# Patient Record
Sex: FEMALE | Race: WHITE | Employment: PART TIME | URBAN - NONMETROPOLITAN AREA
[De-identification: names, ages, dates, MRNs, and addresses within clinical notes are randomized per-mention and may not be internally consistent; named-entity substitution may affect disease eponyms.]

---

## 2017-09-08 ENCOUNTER — APPOINTMENT (OUTPATIENT)
Dept: GENERAL RADIOLOGY | Age: 52
End: 2017-09-08
Payer: COMMERCIAL

## 2017-09-08 ENCOUNTER — HOSPITAL ENCOUNTER (EMERGENCY)
Age: 52
Discharge: HOME | End: 2017-09-08
Attending: EMERGENCY MEDICINE
Payer: COMMERCIAL

## 2017-09-08 VITALS
TEMPERATURE: 98.3 F | DIASTOLIC BLOOD PRESSURE: 72 MMHG | HEART RATE: 104 BPM | SYSTOLIC BLOOD PRESSURE: 139 MMHG | BODY MASS INDEX: 38.64 KG/M2 | HEIGHT: 62 IN | RESPIRATION RATE: 18 BRPM | WEIGHT: 210 LBS | OXYGEN SATURATION: 100 %

## 2017-09-08 DIAGNOSIS — S20.219A CONTUSION OF CHEST WALL, UNSPECIFIED LATERALITY, INITIAL ENCOUNTER: ICD-10-CM
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
## 2017-09-08 DIAGNOSIS — M23.92 KNEE INTERNAL DERANGEMENT, LEFT: ICD-10-CM

## 2017-09-08 DIAGNOSIS — S80.02XA CONTUSION OF LEFT KNEE, INITIAL ENCOUNTER: ICD-10-CM

## 2017-09-08 LAB
ALBUMIN SERPL-MCNC: 4.2 G/DL (ref 3.5–5.1)
ALP BLD-CCNC: 44 U/L (ref 38–126)
ALT SERPL-CCNC: 12 U/L (ref 11–66)
AMPHETAMINE+METHAMPHETAMINE URINE SCREEN: NEGATIVE
ANION GAP SERPL CALCULATED.3IONS-SCNC: 6 MEQ/L (ref 8–16)
AST SERPL-CCNC: 16 U/L (ref 5–40)
BARBITURATE QUANTITATIVE URINE: NEGATIVE
BASOPHILS # BLD: 0.7 %
BASOPHILS ABSOLUTE: 0.1 THOU/MM3 (ref 0–0.1)
BENZODIAZEPINE QUANTITATIVE URINE: NEGATIVE
BILIRUB SERPL-MCNC: 0.4 MG/DL (ref 0.3–1.2)
BUN BLDV-MCNC: 20 MG/DL (ref 7–22)
CALCIUM SERPL-MCNC: 9.4 MG/DL (ref 8.5–10.5)
CANNABINOID QUANTITATIVE URINE: NEGATIVE
CHLORIDE BLD-SCNC: 100 MEQ/L (ref 98–111)
CO2: 27 MEQ/L (ref 23–33)
COCAINE METABOLITE QUANTITATIVE URINE: NEGATIVE
CREAT SERPL-MCNC: 0.7 MG/DL (ref 0.4–1.2)
EOSINOPHIL # BLD: 1.1 %
EOSINOPHILS ABSOLUTE: 0.1 THOU/MM3 (ref 0–0.4)
ETHYL ALCOHOL, SERUM: < 0.01 %
GFR SERPL CREATININE-BSD FRML MDRD: 87 ML/MIN/1.73M2
GLUCOSE BLD-MCNC: 103 MG/DL (ref 70–108)
HCT VFR BLD CALC: 40.2 % (ref 37–47)
HEMOGLOBIN: 13.7 GM/DL (ref 12–16)
LYMPHOCYTES # BLD: 10 %
LYMPHOCYTES ABSOLUTE: 1.2 THOU/MM3 (ref 1–4.8)
MCH RBC QN AUTO: 31.4 PG (ref 27–31)
MCHC RBC AUTO-ENTMCNC: 34 GM/DL (ref 33–37)
MCV RBC AUTO: 92.4 FL (ref 81–99)
MONOCYTES # BLD: 5.3 %
MONOCYTES ABSOLUTE: 0.7 THOU/MM3 (ref 0.4–1.3)
NUCLEATED RED BLOOD CELLS: 0 /100 WBC
OPIATES, URINE: NEGATIVE
OSMOLALITY CALCULATION: 269.2 MOSMOL/KG (ref 275–300)
OXYCODONE: NEGATIVE
PDW BLD-RTO: 13.7 % (ref 11.5–14.5)
PHENCYCLIDINE QUANTITATIVE URINE: NEGATIVE
PLATELET # BLD: 383 THOU/MM3 (ref 130–400)
PMV BLD AUTO: 8.6 MCM (ref 7.4–10.4)
POTASSIUM SERPL-SCNC: 4.5 MEQ/L (ref 3.5–5.2)
RBC # BLD: 4.35 MILL/MM3 (ref 4.2–5.4)
RBC # BLD: NORMAL 10*6/UL
SEG NEUTROPHILS: 82.9 %
SEGMENTED NEUTROPHILS ABSOLUTE COUNT: 10.2 THOU/MM3 (ref 1.8–7.7)
SODIUM BLD-SCNC: 133 MEQ/L (ref 135–145)
TOTAL PROTEIN: 6.9 G/DL (ref 6.1–8)
WBC # BLD: 12.3 THOU/MM3 (ref 4.8–10.8)

## 2017-09-08 PROCEDURE — 80307 DRUG TEST PRSMV CHEM ANLYZR: CPT

## 2017-09-08 PROCEDURE — 71020 XR CHEST STANDARD TWO VW: CPT

## 2017-09-08 PROCEDURE — G0480 DRUG TEST DEF 1-7 CLASSES: HCPCS

## 2017-09-08 PROCEDURE — 99999 PR OFFICE/OUTPT VISIT,PROCEDURE ONLY: CPT | Performed by: NURSE PRACTITIONER

## 2017-09-08 PROCEDURE — 36415 COLL VENOUS BLD VENIPUNCTURE: CPT

## 2017-09-08 PROCEDURE — 6820000002 HC L2 INJURY CALL ACTIVATION

## 2017-09-08 PROCEDURE — 80053 COMPREHEN METABOLIC PANEL: CPT

## 2017-09-08 PROCEDURE — 99284 EMERGENCY DEPT VISIT MOD MDM: CPT

## 2017-09-08 PROCEDURE — 85025 COMPLETE CBC W/AUTO DIFF WBC: CPT

## 2017-09-08 RX ORDER — CYCLOBENZAPRINE HCL 10 MG
10 TABLET ORAL 3 TIMES DAILY PRN
Qty: 15 TABLET | Refills: 0 | Status: ON HOLD | OUTPATIENT
Start: 2017-09-08 | End: 2022-06-06

## 2017-09-08 RX ORDER — NAPROXEN 500 MG/1
500 TABLET ORAL 2 TIMES DAILY
Qty: 60 TABLET | Refills: 0 | Status: ON HOLD | OUTPATIENT
Start: 2017-09-08 | End: 2022-06-06

## 2017-09-08 ASSESSMENT — ENCOUNTER SYMPTOMS
VOMITING: 0
WHEEZING: 0
EYE PAIN: 0
EYE DISCHARGE: 0
BLOOD IN STOOL: 0
ABDOMINAL DISTENTION: 0
SINUS PRESSURE: 0
RHINORRHEA: 0
CHOKING: 0
SHORTNESS OF BREATH: 0
COLOR CHANGE: 0
DIARRHEA: 0
PHOTOPHOBIA: 0
CHEST TIGHTNESS: 0
STRIDOR: 0
NAUSEA: 0
FACIAL SWELLING: 0
COUGH: 0
SORE THROAT: 0
BACK PAIN: 0
EYE ITCHING: 0
VOICE CHANGE: 0
CONSTIPATION: 0
EYE REDNESS: 0
ABDOMINAL PAIN: 0
APNEA: 0
TROUBLE SWALLOWING: 0

## 2017-09-08 ASSESSMENT — PAIN DESCRIPTION - FREQUENCY: FREQUENCY: CONTINUOUS

## 2017-09-08 ASSESSMENT — PAIN DESCRIPTION - PAIN TYPE: TYPE: ACUTE PAIN

## 2017-09-08 ASSESSMENT — PAIN SCALES - GENERAL: PAINLEVEL_OUTOF10: 3

## 2017-09-08 ASSESSMENT — PAIN DESCRIPTION - ORIENTATION: ORIENTATION: LEFT

## 2017-09-08 ASSESSMENT — PAIN DESCRIPTION - LOCATION: LOCATION: KNEE

## 2018-07-24 ENCOUNTER — HOSPITAL ENCOUNTER (EMERGENCY)
Dept: HOSPITAL 25 - ED | Age: 53
Discharge: HOME | End: 2018-07-24
Payer: SELF-PAY

## 2018-07-24 VITALS — DIASTOLIC BLOOD PRESSURE: 78 MMHG | SYSTOLIC BLOOD PRESSURE: 132 MMHG

## 2018-07-24 DIAGNOSIS — Z88.0: ICD-10-CM

## 2018-07-24 DIAGNOSIS — R11.2: ICD-10-CM

## 2018-07-24 DIAGNOSIS — R07.9: Primary | ICD-10-CM

## 2018-07-24 LAB
BASOPHILS # BLD AUTO: 0 10^3/UL (ref 0–0.2)
EOSINOPHIL # BLD AUTO: 0.1 10^3/UL (ref 0–0.6)
HCT VFR BLD AUTO: 39 % (ref 35–47)
HGB BLD-MCNC: 12.7 G/DL (ref 12–16)
LYMPHOCYTES # BLD AUTO: 1.2 10^3/UL (ref 1–4.8)
MCH RBC QN AUTO: 30 PG (ref 27–31)
MCHC RBC AUTO-ENTMCNC: 33 G/DL (ref 31–36)
MCV RBC AUTO: 92 FL (ref 80–97)
MONOCYTES # BLD AUTO: 0.7 10^3/UL (ref 0–0.8)
NEUTROPHILS # BLD AUTO: 11.7 10^3/UL (ref 1.5–7.7)
NRBC # BLD AUTO: 0 10^3/UL
NRBC BLD QL AUTO: 0
PLATELET # BLD AUTO: 320 10^3/UL (ref 150–450)
RBC # BLD AUTO: 4.24 10^6/UL (ref 4–5.4)
WBC # BLD AUTO: 13.7 10^3/UL (ref 3.5–10.8)

## 2018-07-24 PROCEDURE — 96374 THER/PROPH/DIAG INJ IV PUSH: CPT

## 2018-07-24 PROCEDURE — 83735 ASSAY OF MAGNESIUM: CPT

## 2018-07-24 PROCEDURE — 36415 COLL VENOUS BLD VENIPUNCTURE: CPT

## 2018-07-24 PROCEDURE — 80053 COMPREHEN METABOLIC PANEL: CPT

## 2018-07-24 PROCEDURE — 99283 EMERGENCY DEPT VISIT LOW MDM: CPT

## 2018-07-24 PROCEDURE — 83605 ASSAY OF LACTIC ACID: CPT

## 2018-07-24 PROCEDURE — 85025 COMPLETE CBC W/AUTO DIFF WBC: CPT

## 2018-07-24 PROCEDURE — 84443 ASSAY THYROID STIM HORMONE: CPT

## 2018-07-24 PROCEDURE — 82553 CREATINE MB FRACTION: CPT

## 2018-07-24 PROCEDURE — 84484 ASSAY OF TROPONIN QUANT: CPT

## 2018-07-24 PROCEDURE — 71045 X-RAY EXAM CHEST 1 VIEW: CPT

## 2018-07-24 PROCEDURE — 93005 ELECTROCARDIOGRAM TRACING: CPT

## 2018-07-24 PROCEDURE — 83880 ASSAY OF NATRIURETIC PEPTIDE: CPT

## 2018-07-24 NOTE — ED
HPI Chest Pain





- HPI Summary


HPI Summary: 


51 y/o male presents to the ED c/o sudden onset CP starting 2 hours PTA. Pt was 

sitting in the car when it started. CP feeling better now. Pain described as a 

pressure, located in the mid-sternal region. Pt never had these symptoms 

before. Pain rated 5/10. Denies PMHx DM, HTN. Pt vomited in the ED, and states 

she feels better after it. Associated sx: nausea.








This is scribe Ed Zoey documenting for attending Ramiro Vargas MD





- History of Current Complaint


Chief Complaint: EDChestPainROMI


Time Seen by Provider: 07/24/18 14:38


Hx Obtained From: Patient


Onset/Duration: Started Hours Ago


Initial Severity: Moderate


Current Severity: Mild


Pain Intensity: 5


Chest Pain Location: Mid Sternal


Chest Pain Radiates To:: Epigastric


Character: Pressure/Squeezing


Aggravating Factor(s): Nothing


Alleviating Factor(s): Other: - vomiting


Associated Signs and Symptoms: Positive: Chest Pain, Nausea, Vomiting





- Allergy/Home Medications


Allergies/Adverse Reactions: 


 Allergies











Allergy/AdvReac Type Severity Reaction Status Date / Time


 


Sulfa (Sulfonamide Allergy  Hives Verified 07/24/18 14:43





Antibiotics)     











Home Medications: 


 Home Medications





Magnesium Oxide [Magnesium] 400 mg PO DAILY 07/24/18 [History Confirmed 07/24/18

]


Multivitamins/Minerals TAB* [Theragran/minerals TAB*] 1 tab PO DAILY 07/24/18 [

History Confirmed 07/24/18]


Vitamin B Complex CAP* [B Complex CAP*] 1 cap PO DAILY 07/24/18 [History 

Confirmed 07/24/18]











PMH/Surg Hx/FS Hx/Imm Hx


Previously Healthy: No


Endocrine/Hematology History: 


   Denies: Hx Diabetes


Cardiovascular History: 


   Denies: Hx Hypertension





- Immunization History


Immunizations Up to Date: Yes


Infectious Disease History: No


Infectious Disease History: 


   Denies: Traveled Outside the [place] in Last 30 Days





- Family History


Known Family History: Positive: Unknown





- Social History


Alcohol Use: None


Hx Substance Use: No


Substance Use Type: Reports: None


Hx Tobacco Use: No


Smoking Status (MU): Never Smoked Tobacco





Review of Systems


Constitutional: Negative


Eyes: Negative


ENT: Negative


Positive: Chest Pain


Respiratory: Negative


Positive: Vomiting, Nausea


Genitourinary: Negative


Musculoskeletal: Negative


Skin: Negative


Neurological: Negative


Psychological: Normal


All Other Systems Reviewed And Are Negative: Yes





Physical Exam





- Summary


Physical Exam Summary: 





VITAL SIGNS: Reviewed.


GENERAL: Patient is a well-developed and nourished female who is lying 

comfortable in the stretcher. Patient is not in any acute respiratory distress.


HEAD AND FACE: No signs of trauma. No ecchymosis, hematomas or skull 

depressions. No sinus tenderness.


EYES: PERRLA, EOMI x 2, No injected conjunctiva, no nystagmus.


EARS: Hearing grossly intact. Ear canals and tympanic membranes are within 

normal limits.


MOUTH: Oropharynx within normal limits.


NECK: Supple, trachea is midline, no adenopathy, no JVD, no carotid bruit, no c-

spine tenderness, neck with full ROM.


CHEST: Symmetric, no tenderness at palpation


LUNGS: Clear to auscultation bilaterally. No wheezing or crackles.


CVS: Regular rate and rhythm, S1 and S2 present, no murmurs or gallops 

appreciated.


ABDOMEN: Soft, non-tender. No signs of distention. No rebound no guarding, and 

no masses palpated. Bowel sounds are normal.


EXTREMITIES: FROM in all major joints, no edema, no cyanosis or clubbing.


NEURO: Alert and oriented x 3. No acute neurological deficits. Speech is normal 

and follows commands.


SKIN: Dry and warm


Triage Information Reviewed: Yes


Vital Signs On Initial Exam: 


 Initial Vitals











Temp Pulse Resp BP Pulse Ox


 


 98.2 F   67   16   126/67   99 


 


 07/24/18 14:25  07/24/18 14:25  07/24/18 14:25  07/24/18 14:25  07/24/18 14:25











Vital Signs Reviewed: Yes





Diagnostics





- Vital Signs


 Vital Signs











  Temp Pulse Resp BP Pulse Ox


 


 07/24/18 14:25  98.2 F  67  16  126/67  99














- Laboratory


Result Diagrams: 


 07/24/18 15:56





 07/24/18 15:56


Lab Statement: Any lab studies that have been ordered have been reviewed, and 

results considered in the medical decision making process.





- Radiology


  ** CXR


Xray Interpretation: No Acute Changes - NO ACTIVE CARDIOPULMONARY DISEASE


Radiology Interpretation Completed By: Radiologist





- Additional Comments


Diagnostic Additional Comments: 





15:09 - SR @ 73 BPM. No ST elevations. Q wave in III.





Re-Evaluation





- Re-Evaluation


  ** 1


Re-Evaluation Time: 18:13


Change: Improved


Comment: Discussed test results and findings, plan of care





Chest Pain Course/Dx





- Course


Assessment/Plan: Patient is a 52-year-old female who presents to the emergency 

department with a chief complaint of having chest pain.  Patient reports that  

she developed this pain after she ate a hamburger.  During the physical exam 

actually the  pain is in the epigastric area and the distal the chest.  While 

she was waiting in the emergency department she developed nausea and vomiting 

and after she vomited SYMPTOMS resolved.  At this time the patient is 

asymptomatic.  Blood test results without any significant abnormality.  2 

troponins 4 hours apart at 0.00.  EKG is a normal sinus rhythm without any ST 

elevation.  Chest x-ray impression: No active cardiopulmonary disease.  In the 

emergency room course the patient has remained asymptomatic, all her symptoms 

have resolved and not returned.  I discussed all the findings and test results 

with the patient and the patients  and the need to follow up with the 

primary care physician.  She was instructed to return to the emergency 

department if she develops any other symptoms.  The patient and the patients 

 understand and agree.  The patient is hemodynamically stable alert and 

oriented 3.





- Diagnoses


Provider Diagnoses: 


 Chest pain








Discharge





- Sign-Out/Discharge


Documenting (check all that apply): Patient Departure





- Discharge Plan


Condition: Stable


Disposition: HOME


Patient Education Materials:  Chest Pain (ED)


Referrals: 


[facility] [place] [facility] [Outside] - 4 Days (PLEASE F/U IN 3-5 DAYS)


Additional Instructions: 


RETURN TO THE ED FOR CHANGING/WORSENING SYMPTOMS





- Billing Disposition and Condition


Condition: STABLE


Disposition: Home

## 2018-07-24 NOTE — RAD
HISTORY: CP, chest pain



COMPARISONS: None



VIEWS: 1: frontal portable view of the chest at 3:05 PM



FINDINGS:

LINES AND TUBES: None.

CARDIOMEDIASTINAL SILHOUETTE: The cardiomediastinal silhouette is normal for portable

technique.

PLEURA: The costophrenic angles are sharp. No pleural abnormalities are noted.

LUNG PARENCHYMA: The lungs are clear.

ABDOMEN: The upper abdomen is clear. There is no subphrenic gas.

BONES AND SOFT TISSUES: Degenerative changes are noted along the spine.



IMPRESSION: NO ACTIVE CARDIOPULMONARY DISEASE.

## 2022-06-04 ENCOUNTER — APPOINTMENT (OUTPATIENT)
Dept: ULTRASOUND IMAGING | Age: 57
DRG: 415 | End: 2022-06-04

## 2022-06-04 ENCOUNTER — APPOINTMENT (OUTPATIENT)
Dept: CT IMAGING | Age: 57
DRG: 415 | End: 2022-06-04

## 2022-06-04 ENCOUNTER — HOSPITAL ENCOUNTER (INPATIENT)
Age: 57
LOS: 4 days | Discharge: HOME | DRG: 415 | End: 2022-06-08
Attending: EMERGENCY MEDICINE | Admitting: INTERNAL MEDICINE
Payer: COMMERCIAL

## 2022-06-04 DIAGNOSIS — R10.84 GENERALIZED ABDOMINAL PAIN: ICD-10-CM

## 2022-06-04 DIAGNOSIS — R10.11 RIGHT UPPER QUADRANT ABDOMINAL PAIN: ICD-10-CM

## 2022-06-04 DIAGNOSIS — N30.01 ACUTE CYSTITIS WITH HEMATURIA: ICD-10-CM

## 2022-06-04 DIAGNOSIS — K80.00 ACUTE CHOLECYSTITIS DUE TO BILIARY CALCULUS: Primary | ICD-10-CM

## 2022-06-04 DIAGNOSIS — D72.829 LEUKOCYTOSIS, UNSPECIFIED TYPE: ICD-10-CM

## 2022-06-04 DIAGNOSIS — K80.00 CALCULUS OF GALLBLADDER WITH ACUTE CHOLECYSTITIS WITHOUT OBSTRUCTION: ICD-10-CM

## 2022-06-04 LAB
ALBUMIN SERPL-MCNC: 4.3 G/DL (ref 3.5–5.1)
ALP BLD-CCNC: 95 U/L (ref 38–126)
ALT SERPL-CCNC: 44 U/L (ref 11–66)
AMORPHOUS: ABNORMAL
AST SERPL-CCNC: 49 U/L (ref 5–40)
BACTERIA: ABNORMAL /HPF
BASOPHILS # BLD: 0.1 %
BASOPHILS ABSOLUTE: 0 THOU/MM3 (ref 0–0.1)
BILIRUB SERPL-MCNC: 1.5 MG/DL (ref 0.3–1.2)
BILIRUBIN DIRECT: 0.6 MG/DL (ref 0–0.3)
BILIRUBIN URINE: ABNORMAL
BILIRUBIN URINE: ABNORMAL
BLOOD, URINE: ABNORMAL
BLOOD, URINE: ABNORMAL
BUN, WHOLE BLOOD: 20 MG/DL (ref 8–26)
CASTS UA: ABNORMAL /LPF
CHARACTER, URINE: ABNORMAL
CHARACTER, URINE: ABNORMAL
CHLORIDE, WHOLE BLOOD: 103 MEQ/L (ref 98–109)
COLOR: ABNORMAL
COLOR: ABNORMAL
CREAT SERPL-MCNC: 0.5 MG/DL (ref 0.5–1.2)
CRYSTALS, UA: ABNORMAL
EKG ATRIAL RATE: 119 BPM
EKG P AXIS: 65 DEGREES
EKG P-R INTERVAL: 156 MS
EKG Q-T INTERVAL: 316 MS
EKG QRS DURATION: 78 MS
EKG QTC CALCULATION (BAZETT): 444 MS
EKG R AXIS: 4 DEGREES
EKG T AXIS: 68 DEGREES
EKG VENTRICULAR RATE: 119 BPM
EOSINOPHIL # BLD: 0 %
EOSINOPHILS ABSOLUTE: 0 THOU/MM3 (ref 0–0.4)
EPITHELIAL CELLS, UA: ABNORMAL /HPF
GFR, ESTIMATED: > 90 ML/MIN/1.73M2
GLUCOSE BLD-MCNC: 149 MG/DL (ref 70–108)
GLUCOSE URINE: NEGATIVE MG/DL
GLUCOSE URINE: NEGATIVE MG/DL
GLUCOSE, WHOLE BLOOD: 183 MG/DL (ref 70–108)
HCT VFR BLD CALC: 41.9 % (ref 37–47)
HEMOGLOBIN: 14.3 GM/DL (ref 12–16)
ICTOTEST: NEGATIVE
IMMATURE GRANS (ABS): 0.17 THOU/MM3 (ref 0–0.07)
IMMATURE GRANULOCYTES: 0.7 %
KETONES, URINE: 15
KETONES, URINE: ABNORMAL
LACTIC ACID, SEPSIS: 1.4 MMOL/L (ref 0.5–1.9)
LACTIC ACID, SEPSIS: 2.4 MMOL/L (ref 0.5–1.9)
LEUKOCYTE ESTERASE, URINE: NEGATIVE
LEUKOCYTE ESTERASE, URINE: NEGATIVE
LIPASE: 9 U/L (ref 5.6–51.3)
LYMPHOCYTES # BLD: 3.4 %
LYMPHOCYTES ABSOLUTE: 0.9 THOU/MM3 (ref 1–4.8)
MCH RBC QN AUTO: 31.1 PG (ref 27–31)
MCHC RBC AUTO-ENTMCNC: 34.1 GM/DL (ref 33–37)
MCV RBC AUTO: 91 FL (ref 81–99)
MONOCYTES # BLD: 7.8 %
MONOCYTES ABSOLUTE: 2 THOU/MM3 (ref 0.4–1.3)
NITRITE, URINE: POSITIVE
NITRITE, URINE: POSITIVE
NUCLEATED RED BLOOD CELLS: 0 /100 WBC
PDW BLD-RTO: 13.7 % (ref 11.5–14.5)
PH UA: 5 (ref 5–9)
PH UA: 5.5 (ref 5–9)
PLATELET # BLD: 412 THOU/MM3 (ref 130–400)
PMV BLD AUTO: 10.6 FL (ref 7.4–10.4)
POTASSIUM, WHOLE BLOOD: 3.8 MEQ/L (ref 3.5–4.9)
PREGNANCY, SERUM: NEGATIVE
PROCALCITONIN: 0.83 NG/ML (ref 0.01–0.09)
PROTEIN UA: 100
PROTEIN UA: 100 MG/DL
RBC # BLD: 4.6 MILL/MM3 (ref 4.2–5.4)
RBC URINE: ABNORMAL /HPF
RENAL EPITHELIAL, UA: ABNORMAL
SEG NEUTROPHILS: 88 %
SEGMENTED NEUTROPHILS ABSOLUTE COUNT: 22.4 THOU/MM3 (ref 1.8–7.7)
SODIUM BLD-SCNC: 133 MEQ/L (ref 138–146)
SPECIFIC GRAVITY, URINE: >= 1.03 (ref 1–1.03)
SPECIFIC GRAVITY, URINE: >= 1.03 (ref 1–1.03)
TOTAL CO2, WHOLE BLOOD: 23 MEQ/L (ref 23–33)
TOTAL PROTEIN: 7.5 G/DL (ref 6.1–8)
UROBILINOGEN, URINE: 1 EU/DL (ref 0.2–1)
UROBILINOGEN, URINE: 2 EU/DL (ref 0–1)
WBC # BLD: 25.4 THOU/MM3 (ref 4.8–10.8)
WBC UA: ABNORMAL /HPF
YEAST: ABNORMAL

## 2022-06-04 PROCEDURE — 87040 BLOOD CULTURE FOR BACTERIA: CPT

## 2022-06-04 PROCEDURE — 99285 EMERGENCY DEPT VISIT HI MDM: CPT

## 2022-06-04 PROCEDURE — 83605 ASSAY OF LACTIC ACID: CPT

## 2022-06-04 PROCEDURE — 76705 ECHO EXAM OF ABDOMEN: CPT

## 2022-06-04 PROCEDURE — C9113 INJ PANTOPRAZOLE SODIUM, VIA: HCPCS | Performed by: EMERGENCY MEDICINE

## 2022-06-04 PROCEDURE — 83690 ASSAY OF LIPASE: CPT

## 2022-06-04 PROCEDURE — 99215 OFFICE O/P EST HI 40 MIN: CPT

## 2022-06-04 PROCEDURE — 81001 URINALYSIS AUTO W/SCOPE: CPT

## 2022-06-04 PROCEDURE — 84145 PROCALCITONIN (PCT): CPT

## 2022-06-04 PROCEDURE — 2060000000 HC ICU INTERMEDIATE R&B

## 2022-06-04 PROCEDURE — 82947 ASSAY GLUCOSE BLOOD QUANT: CPT

## 2022-06-04 PROCEDURE — 6360000002 HC RX W HCPCS: Performed by: EMERGENCY MEDICINE

## 2022-06-04 PROCEDURE — 80051 ELECTROLYTE PANEL: CPT

## 2022-06-04 PROCEDURE — 6370000000 HC RX 637 (ALT 250 FOR IP): Performed by: INTERNAL MEDICINE

## 2022-06-04 PROCEDURE — 85025 COMPLETE CBC W/AUTO DIFF WBC: CPT

## 2022-06-04 PROCEDURE — 96361 HYDRATE IV INFUSION ADD-ON: CPT

## 2022-06-04 PROCEDURE — 80076 HEPATIC FUNCTION PANEL: CPT

## 2022-06-04 PROCEDURE — 81003 URINALYSIS AUTO W/O SCOPE: CPT

## 2022-06-04 PROCEDURE — 36415 COLL VENOUS BLD VENIPUNCTURE: CPT

## 2022-06-04 PROCEDURE — 2580000003 HC RX 258: Performed by: INTERNAL MEDICINE

## 2022-06-04 PROCEDURE — 74176 CT ABD & PELVIS W/O CONTRAST: CPT

## 2022-06-04 PROCEDURE — 96365 THER/PROPH/DIAG IV INF INIT: CPT

## 2022-06-04 PROCEDURE — 2500000003 HC RX 250 WO HCPCS: Performed by: INTERNAL MEDICINE

## 2022-06-04 PROCEDURE — 82565 ASSAY OF CREATININE: CPT

## 2022-06-04 PROCEDURE — 93005 ELECTROCARDIOGRAM TRACING: CPT | Performed by: INTERNAL MEDICINE

## 2022-06-04 PROCEDURE — 87086 URINE CULTURE/COLONY COUNT: CPT

## 2022-06-04 PROCEDURE — 84520 ASSAY OF UREA NITROGEN: CPT

## 2022-06-04 PROCEDURE — 82948 REAGENT STRIP/BLOOD GLUCOSE: CPT

## 2022-06-04 PROCEDURE — 87801 DETECT AGNT MULT DNA AMPLI: CPT

## 2022-06-04 PROCEDURE — 83036 HEMOGLOBIN GLYCOSYLATED A1C: CPT

## 2022-06-04 PROCEDURE — 84703 CHORIONIC GONADOTROPIN ASSAY: CPT

## 2022-06-04 PROCEDURE — 6360000002 HC RX W HCPCS: Performed by: INTERNAL MEDICINE

## 2022-06-04 PROCEDURE — 2580000003 HC RX 258: Performed by: EMERGENCY MEDICINE

## 2022-06-04 PROCEDURE — 96375 TX/PRO/DX INJ NEW DRUG ADDON: CPT

## 2022-06-04 PROCEDURE — A4216 STERILE WATER/SALINE, 10 ML: HCPCS | Performed by: EMERGENCY MEDICINE

## 2022-06-04 PROCEDURE — 99223 1ST HOSP IP/OBS HIGH 75: CPT | Performed by: SURGERY

## 2022-06-04 RX ORDER — SODIUM CHLORIDE 0.9 % (FLUSH) 0.9 %
5-40 SYRINGE (ML) INJECTION EVERY 12 HOURS SCHEDULED
Status: DISCONTINUED | OUTPATIENT
Start: 2022-06-04 | End: 2022-06-08 | Stop reason: HOSPADM

## 2022-06-04 RX ORDER — ONDANSETRON 4 MG/1
4 TABLET, ORALLY DISINTEGRATING ORAL EVERY 8 HOURS PRN
Status: DISCONTINUED | OUTPATIENT
Start: 2022-06-04 | End: 2022-06-08 | Stop reason: HOSPADM

## 2022-06-04 RX ORDER — POTASSIUM CHLORIDE 20 MEQ/1
40 TABLET, EXTENDED RELEASE ORAL PRN
Status: DISCONTINUED | OUTPATIENT
Start: 2022-06-04 | End: 2022-06-08 | Stop reason: HOSPADM

## 2022-06-04 RX ORDER — FAMOTIDINE 10 MG/ML
20 INJECTION, SOLUTION INTRAVENOUS 2 TIMES DAILY
Status: DISCONTINUED | OUTPATIENT
Start: 2022-06-04 | End: 2022-06-08 | Stop reason: HOSPADM

## 2022-06-04 RX ORDER — ACETAMINOPHEN 650 MG/1
650 SUPPOSITORY RECTAL EVERY 6 HOURS PRN
Status: DISCONTINUED | OUTPATIENT
Start: 2022-06-04 | End: 2022-06-08 | Stop reason: HOSPADM

## 2022-06-04 RX ORDER — SODIUM CHLORIDE 9 MG/ML
INJECTION, SOLUTION INTRAVENOUS CONTINUOUS
Status: DISCONTINUED | OUTPATIENT
Start: 2022-06-04 | End: 2022-06-05

## 2022-06-04 RX ORDER — SODIUM CHLORIDE 9 MG/ML
INJECTION, SOLUTION INTRAVENOUS CONTINUOUS
Status: DISCONTINUED | OUTPATIENT
Start: 2022-06-04 | End: 2022-06-08

## 2022-06-04 RX ORDER — ONDANSETRON 2 MG/ML
4 INJECTION INTRAMUSCULAR; INTRAVENOUS ONCE
Status: COMPLETED | OUTPATIENT
Start: 2022-06-04 | End: 2022-06-04

## 2022-06-04 RX ORDER — ACETAMINOPHEN 325 MG/1
650 TABLET ORAL EVERY 6 HOURS PRN
Status: DISCONTINUED | OUTPATIENT
Start: 2022-06-04 | End: 2022-06-08 | Stop reason: HOSPADM

## 2022-06-04 RX ORDER — SODIUM CHLORIDE 9 MG/ML
INJECTION, SOLUTION INTRAVENOUS PRN
Status: DISCONTINUED | OUTPATIENT
Start: 2022-06-04 | End: 2022-06-08 | Stop reason: HOSPADM

## 2022-06-04 RX ORDER — INDOCYANINE GREEN AND WATER 25 MG
1.25 KIT INJECTION ONCE
Status: DISCONTINUED | OUTPATIENT
Start: 2022-06-05 | End: 2022-06-05 | Stop reason: HOSPADM

## 2022-06-04 RX ORDER — POLYETHYLENE GLYCOL 3350 17 G/17G
17 POWDER, FOR SOLUTION ORAL DAILY PRN
Status: DISCONTINUED | OUTPATIENT
Start: 2022-06-04 | End: 2022-06-08 | Stop reason: HOSPADM

## 2022-06-04 RX ORDER — ENOXAPARIN SODIUM 100 MG/ML
30 INJECTION SUBCUTANEOUS 2 TIMES DAILY
Status: DISCONTINUED | OUTPATIENT
Start: 2022-06-04 | End: 2022-06-05

## 2022-06-04 RX ORDER — INSULIN LISPRO 100 [IU]/ML
0-6 INJECTION, SOLUTION INTRAVENOUS; SUBCUTANEOUS EVERY 4 HOURS
Status: DISCONTINUED | OUTPATIENT
Start: 2022-06-04 | End: 2022-06-08 | Stop reason: HOSPADM

## 2022-06-04 RX ORDER — ONDANSETRON 2 MG/ML
4 INJECTION INTRAMUSCULAR; INTRAVENOUS EVERY 6 HOURS PRN
Status: DISCONTINUED | OUTPATIENT
Start: 2022-06-04 | End: 2022-06-08 | Stop reason: HOSPADM

## 2022-06-04 RX ORDER — DEXTROSE MONOHYDRATE 50 MG/ML
100 INJECTION, SOLUTION INTRAVENOUS PRN
Status: DISCONTINUED | OUTPATIENT
Start: 2022-06-04 | End: 2022-06-08 | Stop reason: HOSPADM

## 2022-06-04 RX ORDER — POTASSIUM CHLORIDE 7.45 MG/ML
10 INJECTION INTRAVENOUS PRN
Status: DISCONTINUED | OUTPATIENT
Start: 2022-06-04 | End: 2022-06-08 | Stop reason: HOSPADM

## 2022-06-04 RX ORDER — SODIUM CHLORIDE 0.9 % (FLUSH) 0.9 %
5-40 SYRINGE (ML) INJECTION PRN
Status: DISCONTINUED | OUTPATIENT
Start: 2022-06-04 | End: 2022-06-08 | Stop reason: HOSPADM

## 2022-06-04 RX ADMIN — SODIUM CHLORIDE, PRESERVATIVE FREE 10 ML: 5 INJECTION INTRAVENOUS at 21:30

## 2022-06-04 RX ADMIN — HYDROMORPHONE HYDROCHLORIDE 0.5 MG: 1 INJECTION, SOLUTION INTRAMUSCULAR; INTRAVENOUS; SUBCUTANEOUS at 15:01

## 2022-06-04 RX ADMIN — SODIUM CHLORIDE 40 MG: 9 INJECTION, SOLUTION INTRAMUSCULAR; INTRAVENOUS; SUBCUTANEOUS at 15:16

## 2022-06-04 RX ADMIN — ONDANSETRON 4 MG: 2 INJECTION INTRAMUSCULAR; INTRAVENOUS at 15:01

## 2022-06-04 RX ADMIN — SODIUM CHLORIDE: 9 INJECTION, SOLUTION INTRAVENOUS at 18:34

## 2022-06-04 RX ADMIN — PIPERACILLIN AND TAZOBACTAM 3375 MG: 3; .375 INJECTION, POWDER, LYOPHILIZED, FOR SOLUTION INTRAVENOUS at 23:34

## 2022-06-04 RX ADMIN — PIPERACILLIN AND TAZOBACTAM 3375 MG: 3; .375 INJECTION, POWDER, LYOPHILIZED, FOR SOLUTION INTRAVENOUS at 15:39

## 2022-06-04 RX ADMIN — ONDANSETRON 4 MG: 2 INJECTION INTRAMUSCULAR; INTRAVENOUS at 18:48

## 2022-06-04 RX ADMIN — SODIUM CHLORIDE: 9 INJECTION, SOLUTION INTRAVENOUS at 15:20

## 2022-06-04 RX ADMIN — FAMOTIDINE 20 MG: 10 INJECTION, SOLUTION INTRAVENOUS at 21:30

## 2022-06-04 RX ADMIN — INSULIN LISPRO 1 UNITS: 100 INJECTION, SOLUTION INTRAVENOUS; SUBCUTANEOUS at 21:16

## 2022-06-04 RX ADMIN — HYDROMORPHONE HYDROCHLORIDE 0.5 MG: 1 INJECTION, SOLUTION INTRAMUSCULAR; INTRAVENOUS; SUBCUTANEOUS at 18:48

## 2022-06-04 RX ADMIN — ENOXAPARIN SODIUM 30 MG: 100 INJECTION SUBCUTANEOUS at 21:31

## 2022-06-04 ASSESSMENT — PAIN DESCRIPTION - DESCRIPTORS
DESCRIPTORS: ACHING
DESCRIPTORS: SHOOTING
DESCRIPTORS: SHOOTING;SHARP
DESCRIPTORS: SHARP

## 2022-06-04 ASSESSMENT — ENCOUNTER SYMPTOMS
SHORTNESS OF BREATH: 0
VOICE CHANGE: 0
SORE THROAT: 0
NAUSEA: 1
RHINORRHEA: 0
CHEST TIGHTNESS: 0
BLOOD IN STOOL: 0
VOMITING: 1
SINUS PRESSURE: 0
CONSTIPATION: 0
WHEEZING: 0
DIARRHEA: 0
TROUBLE SWALLOWING: 0
ABDOMINAL DISTENTION: 0
ABDOMINAL PAIN: 1
DIARRHEA: 1
COUGH: 0
BACK PAIN: 0

## 2022-06-04 ASSESSMENT — PAIN SCALES - GENERAL
PAINLEVEL_OUTOF10: 4
PAINLEVEL_OUTOF10: 5
PAINLEVEL_OUTOF10: 2
PAINLEVEL_OUTOF10: 3
PAINLEVEL_OUTOF10: 5
PAINLEVEL_OUTOF10: 6
PAINLEVEL_OUTOF10: 8
PAINLEVEL_OUTOF10: 3

## 2022-06-04 ASSESSMENT — PAIN DESCRIPTION - PAIN TYPE: TYPE: ACUTE PAIN

## 2022-06-04 ASSESSMENT — PAIN DESCRIPTION - ONSET: ONSET: ON-GOING

## 2022-06-04 ASSESSMENT — PAIN DESCRIPTION - ORIENTATION
ORIENTATION: RIGHT

## 2022-06-04 ASSESSMENT — PAIN DESCRIPTION - LOCATION
LOCATION: ABDOMEN

## 2022-06-04 ASSESSMENT — PAIN DESCRIPTION - FREQUENCY: FREQUENCY: CONTINUOUS

## 2022-06-04 ASSESSMENT — PAIN - FUNCTIONAL ASSESSMENT
PAIN_FUNCTIONAL_ASSESSMENT: 0-10
PAIN_FUNCTIONAL_ASSESSMENT: PREVENTS OR INTERFERES SOME ACTIVE ACTIVITIES AND ADLS

## 2022-06-04 NOTE — ED NOTES
Patient feeling much more comfortable after medications given. IV antibiotics started. No distress noted. Patient denies needs.      Jessica Hong RN  06/04/22 [id number]

## 2022-06-04 NOTE — ED NOTES
Report to [facility] ED given to STEPHON Harris per Modesta Sands Case, CNP Pt has been transferred by private car from [facility] and with go to [facility] ED for further evaluation in stable condition.      Oscar Senior RN  06/04/22 1779

## 2022-06-04 NOTE — ED TRIAGE NOTES
Pt states that 2 days ago she had lower abdominal pain and attributed to possible food poisoning although pt denied diarrhea or vomiting at that time. Pt states she felt better in am but later yesterday began to have nausea and right lower abdominal pain with vomiting x 3 episodes in 24 hours. Today she continues to have RLQ abdominal pain and hematuria. Denies dysuria. Denies fever.

## 2022-06-04 NOTE — H&P
Heart murmur      SHX:        Procedure Laterality Date    KNEE SURGERY      MANDIBLE SURGERY       FHX: History reviewed. No pertinent family history. SOCHX:   Social History     Socioeconomic History    Marital status:      Spouse name: None    Number of children: None    Years of education: None    Highest education level: None   Occupational History    None   Tobacco Use    Smoking status: Never Smoker    Smokeless tobacco: Never Used   Vaping Use    Vaping Use: Never used   Substance and Sexual Activity    Alcohol use: No    Drug use: No    Sexual activity: None   Other Topics Concern    None   Social History Narrative    None     Social Determinants of Health     Financial Resource Strain:     Difficulty of Paying Living Expenses: Not on file   Food Insecurity:     Worried About Running Out of Food in the Last Year: Not on file    Ti of Food in the Last Year: Not on file   Transportation Needs:     Lack of Transportation (Medical): Not on file    Lack of Transportation (Non-Medical):  Not on file   Physical Activity:     Days of Exercise per Week: Not on file    Minutes of Exercise per Session: Not on file   Stress:     Feeling of Stress : Not on file   Social Connections:     Frequency of Communication with Friends and Family: Not on file    Frequency of Social Gatherings with Friends and Family: Not on file    Attends Jain Services: Not on file    Active Member of [address] [place] [address] or Organizations: Not on file    Attends Club or Organization Meetings: Not on file    Marital Status: Not on file   Intimate Partner Violence:     Fear of Current or Ex-Partner: Not on file    Emotionally Abused: Not on file    Physically Abused: Not on file    Sexually Abused: Not on file   Housing Stability:     Unable to Pay for Housing in the Last Year: Not on file    Number of [place] in the Last Year: Not on file    Unstable Housing in the Last Year: Not on file      Allergies: Sulfa antibiotics  Medications:     sodium chloride 100 mL/hr at 06/04/22 1520      piperacillin-tazobactam  3,375 mg IntraVENous Q8H     Prior to Admission medications    Medication Sig Start Date End Date Taking? Authorizing Provider   naproxen (NAPROSYN) 500 MG tablet Take 1 tablet by mouth 2 times daily  Patient not taking: Reported on 6/4/2022 9/8/17   Mitul Fuentes MD   cyclobenzaprine (FLEXERIL) 10 MG tablet Take 1 tablet by mouth 3 times daily as needed for Muscle spasms  Patient not taking: Reported on 6/4/2022 9/8/17   Mitul Fuentes MD      PHYSICAL EXAM:    BP (!) 155/83   Pulse (!) 113   Temp 98.6 °F (37 °C) (Oral)   Resp 16   Ht 5' 2\" (1.575 m)   Wt 231 lb (104.8 kg)   SpO2 94%   BMI 42.25 kg/m²     General appearance:  No apparent distress, appears stated age and cooperative. HEENT:  Normal cephalic, atraumatic without obvious deformity. Pupils equal, round, and reactive to light. Extra ocular muscles intact. Conjunctivae/corneas clear. Neck: Supple, with full range of motion. no jugular venous distention. Trachea midline. no carotid bruits  Respiratory:  Normal respiratory effort. Clear to auscultation, bilaterally without Rales/Wheezes/Rhonchi. Breath sounds equal bilaterally  Cardiovascular:  Regular rate and rhythm with normal S1/S2 without murmurs, rubs or gallops. PMI non displaced  Abdomen: Soft, RUQ tender, non-distended with normal bowel sounds. No guarding, rebound. Musculoskeletal:  No clubbing, cyanosis or edema bilaterally. Full range of motion without deformity. Skin: Skin color, texture, turgor normal.  No rashes or lesions, or suspicious lesions. Neurologic:  Neurovascularly intact without any focal sensory/motor deficits.  Cranial nerves: II-XII intact, grossly non-focal.  Psychiatric:  Alert and oriented, thought content appropriate, normal insight  Capillary Refill: Brisk,< 2 seconds   Peripheral Pulses: +2 palpable, equal bilaterally upper and lower extremities  Lymphatics: no lymphadenopathy    Data: (All radiographs, tracings, PFTs, and imaging are personally viewed and interpreted unless otherwise noted).    Recent Labs     06/04/22  1211   WBC 25.4*   HGB 14.3   HCT 41.9   *      Recent Labs     06/04/22  1211   *   K 3.8   CREATININE 0.5      Recent Labs     06/04/22  1407   AST 49*   ALT 44   BILIDIR 0.6*   BILITOT 1.5*   ALKPHOS 95       No results for input(s): INR in the last 72 hours.  No results for input(s): [place] in the last 72 hours. Radiology reports-   CT ABDOMEN PELVIS WO CONTRAST Additional Contrast? None    Result Date: 6/4/2022  PROCEDURE: CT ABDOMEN PELVIS WO CONTRAST CLINICAL INFORMATION: 78-year-old female with right-sided abdominal pain for one week . COMPARISON: None. TECHNIQUE: Axial 5 mm CT images were obtained through the abdomen and pelvis. No contrast was given. Sagittal and coronal reconstructions were obtained. All CT scans at this facility use dose modulation, iterative reconstruction, and/or weight-based dosing when appropriate to reduce radiation dose to as low as reasonably achievable. FINDINGS: There is atelectasis at the bilateral lung bases. No pleural effusion or pneumothorax. Lack of IV contrast limits evaluation of the abdominal organs. The gallbladder contains calculi. It is diffusely thickened and there are extensive inflammatory changes in the adjacent fat. The stomach is fluid-filled. There is a small hiatal hernia. Fluid is tracking superiorly into the esophagus. The liver and spleen have smooth contours and are normal in size. The head of the pancreas is obscured due to the extensive inflammatory changes which appear to arise from the gallbladder. The utilized portions of the pancreas appear to be within normal limits. The adrenal glands are within normal limits. There is no hydronephrosis. No evidence of a small bowel obstruction.  The transverse colon has a somewhat thickened appearance near the hepatic flexure thought to be secondary to inflammatory changes arising from the gallbladder. The uterus is present. The urinary bladder is nondistended. The aorta and the IVC are normal in caliber. The bones are intact. No acute fracture. 1. Findings of acute cholecystitis. There is extensive inflammatory haziness in the adjacent fat with reactive thickening of the adjacent portion of the colon. 2. There is a small hiatal hernia and there is fluid tracking superiorly into the esophagus. **This report has been created using voice recognition software. It may contain minor errors which are inherent in voice recognition technology. ** Final report electronically signed by Dr Rm Gomez on 6/4/2022 12:52 PM    US GALLBLADDER RUQ    Result Date: 6/4/2022  PROCEDURE: US GALLBLADDER RUQ CLINICAL INFORMATION: RUQ abdominal pain COMPARISON: CT abdomen and pelvis from same day TECHNIQUE: Grayscale and color Doppler imaging of the gallbladder was performed. TECHNICAL DATA: Gallbladder - 12.97 x 3.94 x 5.00 cm Gallbladder Wall - 0.19 cm Common Duct - 0.46 cm Bermeo's Sign: positive FINDINGS: GALLBLADDER: No gallbladder wall thickening. Distended gallbladder. . Multiple gallstones are seen. Mild pericholecystic fluid is seen. Positive sonographic Bermeo's sign. COMMON DUCT: The common bile duct measures 5 mm and is not dilated. LIVER: 1. Visualized portions of the liver are unremarkable. 1. Distended gallbladder with multiple gallstones, positive sonographic Bermeo's sign and mild pericholecystic fluid. Findings relate to acute cholecystitis. **This report has been created using voice recognition software. It may contain minor errors which are inherent in voice recognition technology. ** Final report electronically signed by Dr Kenrick Jerez on 6/4/2022 4:04 PM      Electronically signed by Arabella Hart DO on 6/4/2022 at 5:27 PM

## 2022-06-04 NOTE — CONSULTS
[address] Surgery Consultation - Jesus Han MD      Pt Name: Romulo Barahona  MRN: [id number]  YOB: 1965  Date of evaluation: 6/4/2022  Primary Care Physician: Maged Ascencio DO  Patient evaluated at the request of  Dr. Sri Morales  Reason for evaluation: calculous cholecystitis  IMPRESSIONS:   1. Acute calculus cholecystitis  2. Urinary tract infection present on admission  3. Obesity BMI of 42  3. has a past medical history of Heart murmur. RECOMMENDATIONS:   1. N.p.o. after midnight  2. Common bile duct not dilated liver function test dilated secondary to calculus cholecystitis. I do not feel MRCP is indicated at this point  3. Plan robotic assisted laparoscopic cholecystectomy with intraoperative cholangiogram possible open in a.m.  4. Techniques and risks of surgery discussed with patient at length in the emergency department. Risk include but not limited to: Conversion of procedure, bile duct leak, bile duct injury, retained stones as well as abscess. Patient expressed understanding all questions answered  5. Continue broad-spectrum antibiotics Zosyn  6. Urine for culture  SUBJECTIVE:   History of Chief Complaint:    Lizett Cooper is a 60 y. o.female who presents with abdominal pain. She reports about 3 days of upper abdominal pain as well as nausea and some emesis. She denies any diarrhea. She did not know she had gallstones. She has no prior episodes of symptoms of biliary colic. She has had urinary tract infections in the past and noted some blood-tinged urine on the toilet paper today. She presented to urgent care for evaluation. CT scan of the abdomen and pelvis was obtained and revealed acute cholecystitis with extensive inflammatory haziness in the adjacent fat. Reactive thickening of adjacent portion of colon. Stones within the gallbladder. The bladder wall was thickened. The pancreas not visualized due to inflammatory changes.   Ultrasound of the gallbladder the gallbladder was almost 13 cm in length common bile duct was 4-1/2 mm not dilated there was gallbladder wall thickening and some pericholecystic fluid. There were multiple gallstones. Urinalysis showed a large amount of blood with 10-15 red cells was turbid there was a few bacteria and was nitrite positive. The bilirubin was 1.5. Tract fraction was 0.6 AST was 49 alkaline phosphatase was normal at 95. Pace was normal.  Past Medical History   has a past medical history of Heart murmur. Past Surgical History   has a past surgical history that includes knee surgery and Mandible surgery. Medications  Prior to Admission medications    Medication Sig Start Date End Date Taking? Authorizing Provider   naproxen (NAPROSYN) 500 MG tablet Take 1 tablet by mouth 2 times daily  Patient not taking: Reported on 6/4/2022 9/8/17   Mariel Morales MD   cyclobenzaprine (FLEXERIL) 10 MG tablet Take 1 tablet by mouth 3 times daily as needed for Muscle spasms  Patient not taking: Reported on 6/4/2022 9/8/17   Mariel Morales MD    Scheduled Meds:   piperacillin-tazobactam  3,375 mg IntraVENous Q8H     Continuous Infusions:   sodium chloride 100 mL/hr at 06/04/22 1520     PRN Meds:. Allergies  is allergic to sulfa antibiotics. Family History  family history is not on file. Social History   reports that she has never smoked. She has never used smokeless tobacco. She reports that she does not drink alcohol and does not use drugs.   Review of Systems  General Denies any fever or chills  HEENT Denies any diplopia, tinnitus or vertigo  Resp Denies any shortness of breath, cough or wheezing  Cardiac history of heart murmur  Denies any chest pain, palpitations, claudication or edema  GI Positive for nausea, vomiting and abdominal pain  Denies any melena, hematochezia, hematemesis or pyrosis   positive for frequency, dysuria and hematuria  Denies any frequency, urgency, hesitancy or incontinence  Heme Denies bruising or bleeding easily  Endocrine negative for diabetic symptoms including none, blurry vision, foot ulcerations, polyuria and polydipsia and temperature intolerance  Neuro Denies any focal motor or sensory deficits  SUBJECTIVE:   CURRENT VITALS:  height is 5' 2\" (1.575 m) and weight is 231 lb (104.8 kg). Her oral temperature is 98.6 °F (37 °C). Her blood pressure is 155/83 (abnormal) and her pulse is 113 (abnormal). Her respiration is 16 and oxygen saturation is 94%. Body mass index is 42.25 kg/m². Temperature Range (24h):Temp: 98.6 °F (37 °C) Temp  Av.2 °F (36.8 °C)  Min: 97.7 °F (36.5 °C)  Max: 98.6 °F (37 °C)  BP Range (43R): Systolic (14JFG), KBJ:880 , Min:119 , OIA:636     Diastolic (61DAA), SZM:87, Min:75, Max:86    Pulse Range (24h): Pulse  Av.6  Min: 112  Max: 130  Respiration Range (24h): Resp  Av  Min: 16  Max: 16  Current Pulse Ox (24h):  SpO2: 94 %  Pulse Ox Range (24h):  SpO2  Av %  Min: 93 %  Max: 95 %  Oxygen Amount and Delivery:    CONSTITUTIONAL: awake, alert, cooperative, no apparent distress, and appears stated age  SKIN: Skin color, texture, turgor normal. No rashes or lesions. LYMPH: no cervical nodes  HEENT: Head is normocephalic, atraumatic. EOMI, PERRLA. NECK: supple, symmetrical, trachea midline. CHEST/LUNGS: chest symmetric with normal A/P diameter, normal respiratory rate and rhythm, lungs clear to auscultation without wheezes, rales or rhonchi. No accessory muscle use. Scars None   CARDIOVASCULAR: Heart regular rate and rhythm   ABDOMEN: Soft obese mild tenderness right upper quadrant. Gallbladder not discretely palpable. No rebound or guarding  RECTAL: deferred, not clinically indicated  NEUROLOGIC: There are no focalizing motor or sensory deficits. CN II-XII are grossly intact. [facility] EXTREMITIES: no cyanosis, no clubbing and no edema.   LABS:     Recent Labs     22  1150 22  1211 22  1407 22  1510   WBC  --  25.4*  --   --    HGB  --  14.3  --   -- HCT  --  41.9  --   --    PLT  --  412*  --   --    NA  --  133*  --   --    K  --  3.8  --   --    CREATININE  --  0.5  --   --    AST  --   --  49*  --    ALT  --   --  44  --    BILITOT  --   --  1.5*  --    BILIDIR  --   --  0.6*  --    LIPASE  --   --  9.0  --    NITRU   < >  --   --  POSITIVE*   COLORU   < >  --   --  RUST   BACTERIA  --   --   --  FEW    < > = values in this interval not displayed. RADIOLOGY:     US GALLBLADDER RUQ   Final Result   1. Distended gallbladder with multiple gallstones, positive sonographic Bermeo's sign and mild pericholecystic fluid. Findings relate to acute cholecystitis. **This report has been created using voice recognition software. It may contain minor errors which are inherent in voice recognition technology. **      Final report electronically signed by Dr Margaret Pimentel on 6/4/2022 4:04 PM      CT ABDOMEN PELVIS WO CONTRAST Additional Contrast? None   Final Result      1. Findings of acute cholecystitis. There is extensive inflammatory haziness in the adjacent fat with reactive thickening of the adjacent portion of the colon. 2. There is a small hiatal hernia and there is fluid tracking superiorly into the esophagus. **This report has been created using voice recognition software. It may contain minor errors which are inherent in voice recognition technology. **      Final report electronically signed by Dr Kaylah Chi on 6/4/2022 12:52 PM              Electronically signed by Manuel Hagen MD on 6/4/2022 at 5:26 PM

## 2022-06-04 NOTE — ED NOTES
ED to inpatient nurses report    Chief Complaint   Patient presents with    Abdominal Pain     right lower quadrant    Hematuria      Present to ED from home  LOC: alert and oriented x3  Vital signs   Vitals:    06/04/22 1134 06/04/22 1410 06/04/22 1418 06/04/22 1510   BP: 133/85 133/86  119/75   Pulse: (!) 130 (!) 125  (!) 118   Resp: 16   16   Temp: 97.7 °F (36.5 °C)  98.6 °F (37 °C)    TempSrc: Oral  Oral    SpO2: 95%   94%   Weight: 231 lb (104.8 kg)      Height: 5' 2\" (1.575 m)         Oxygen Baseline ra    Current needs required ra Bipap/Cpap No  LDAs:   Peripheral IV 06/04/22 Left Antecubital (Active)     Mobility: Independent  Pending ED orders: Yennyn  Present condition: Stable    Electronically signed by Norah Guerin RN on 6/4/2022 at 3:25 PM       Norah Guerin [facility]  06/04/22 1525

## 2022-06-04 NOTE — ED NOTES
Patient changed to gown. Telemetry in place. IV access established. Patient states she is very tired. She did ambulate back to room with a steady gait.       Ahmet Damian RN  06/04/22 1649

## 2022-06-04 NOTE — ED PROVIDER NOTES
[facility]        Room # 09/009A    CHIEF COMPLAINT    Chief Complaint   Patient presents with    Abdominal Pain     right lower quadrant    Hematuria       Nurses Notes reviewed and I agree except as noted in the HPI. HPI    Gabriel Tyson is a 60 y.o. female who presents for evaluation of right upper quadrant abdominal pain for the past 2 nights. The patient's pain initially is on the upper abdomen, it go across from the epigastric area to the left and right, pain has been intermittent. Yesterday the patient has been vomiting all day and that today the pain is now on the right lower quadrant. Denies any fever however had chills. Patient's also noted blood in her urine however denies any dysuria frequency urgency, no vaginal discharge or or vaginal bleeding. Patient's pain is 5/10 however is gets worse to 8-10 over 10 when doing movement and lying down is her back. Denies any constipation, patient was seen at the urgent care, has laboratory showing white count of 25,000 and a CT scan of the abdomen showing acute cholecystitis and urine positive nitrite. Denies alcoholism however she drinks socially last time was 1 month ago. REVIEW OF SYSTEMS    Review of Systems   Constitutional: Negative for appetite change, chills, diaphoresis, fatigue and fever. HENT: Negative for congestion, ear pain, postnasal drip, rhinorrhea, sinus pressure, sneezing, sore throat, trouble swallowing and voice change. Respiratory: Negative for cough, chest tightness, shortness of breath and wheezing. Cardiovascular: Negative for chest pain, palpitations and leg swelling. Gastrointestinal: Positive for abdominal pain, diarrhea, nausea and vomiting. Negative for constipation. Genitourinary: Positive for hematuria. Musculoskeletal: Negative for arthralgias, back pain, joint swelling, myalgias, neck pain and neck stiffness. Neurological: Negative for dizziness, syncope, weakness, light-headedness, numbness and headaches. PAST MEDICAL HISTORY     has a past medical history of Heart murmur. SURGICAL HISTORY   has a past surgical history that includes knee surgery and Mandible surgery. CURRENT MEDICATIONS    Current Discharge Medication List      CONTINUE these medications which have NOT CHANGED    Details   naproxen (NAPROSYN) 500 MG tablet Take 1 tablet by mouth 2 times daily  Qty: 60 tablet, Refills: 0      cyclobenzaprine (FLEXERIL) 10 MG tablet Take 1 tablet by mouth 3 times daily as needed for Muscle spasms  Qty: 15 tablet, Refills: 0             ALLERGIES    is allergic to sulfa antibiotics. FAMILY HISTORY    has no family status information on file. family history is not on file. SOCIAL HISTORY     reports that she has never smoked. She has never used smokeless tobacco. She reports that she does not drink alcohol and does not use drugs. PHYSICAL EXAM      INITIAL VITALS: BP (!) 155/83   Pulse (!) 113   Temp 98.6 °F (37 °C) (Oral)   Resp 16   Ht 5' 2\" (1.575 m)   Wt 231 lb (104.8 kg)   SpO2 94%   BMI 42.25 kg/m² Estimated body mass index is 42.25 kg/m² as calculated from the following:    Height as of this encounter: 5' 2\" (1.575 m). Weight as of this encounter: 231 lb (104.8 kg). Physical Exam  Vitals reviewed. Constitutional:       Appearance: She is well-developed. HENT:      Head: Normocephalic and atraumatic. Right Ear: External ear normal.      Left Ear: External ear normal.      Nose: Nose normal.   Eyes:      General: No scleral icterus. Conjunctiva/sclera: Conjunctivae normal.      Pupils: Pupils are equal, round, and reactive to light. Neck:      Thyroid: No thyromegaly. Vascular: No JVD. Cardiovascular:      Rate and Rhythm: Normal rate and regular rhythm. Heart sounds: No murmur heard. No friction rub.    Pulmonary:      Effort: Pulmonary effort is esophagus. **This report has been created using voice recognition software. It may contain minor errors which are inherent in voice recognition technology. **      Final report electronically signed by Dr Claudene Patch on 6/4/2022 12:52 PM          LABS:   Labs Reviewed   URINALYSIS - Abnormal; Notable for the following components:       Result Value    Bilirubin Urine Moderate (*)     Blood, Urine Large (*)     Protein,  (*)     Nitrite, Urine Positive (*)     Color, UA Alba (*)     All other components within normal limits   CBC WITH AUTO DIFFERENTIAL - Abnormal; Notable for the following components:    WBC 25.4 (*)     MCH 31.1 (*)     Platelets 466 (*)     MPV 10.6 (*)     All other components within normal limits   POC BASIC METABOL PANEL W/O CALCIUM - Abnormal; Notable for the following components:    Sodium 133 (*)     Glucose, Whole Blood 183 (*)     All other components within normal limits   HEPATIC FUNCTION PANEL - Abnormal; Notable for the following components:     Total Bilirubin 1.5 (*)     Bilirubin, Direct 0.6 (*)     AST 49 (*)     All other components within normal limits   LACTATE, SEPSIS - Abnormal; Notable for the following components:    Lactic Acid, Sepsis 2.4 (*)     All other components within normal limits   PROCALCITONIN - Abnormal; Notable for the following components:    Procalcitonin 0.83 (*)     All other components within normal limits   URINE WITH REFLEXED MICRO - Abnormal; Notable for the following components:    Bilirubin Urine MODERATE (*)     Ketones, Urine TRACE (*)     Blood, Urine LARGE (*)     Protein,  (*)     Urobilinogen, Urine 2.0 (*)     Nitrite, Urine POSITIVE (*)     Character, Urine TURBID (*)     All other components within normal limits   CULTURE, BLOOD 1   CULTURE, BLOOD 2   CULTURE, REFLEXED, URINE    Narrative:     Source: urine, clean catch       Site:           Current Antibiotics: not stated   GLOMERULAR FILTRATION RATE, ESTIMATED LIPASE   HCG, SERUM, QUALITATIVE   BILE ACIDS, TOTAL   DIFFERENTIAL    Narrative:     Epic Plan - [id number]   LACTATE, SEPSIS     All other unresulted laboratory test above are normal:    Vitals:    Vitals:    06/04/22 1418 06/04/22 1510 06/04/22 1610 06/04/22 1655   BP:  119/75 136/81 (!) 155/83   Pulse:  (!) 118 (!) 112 (!) 113   Resp:  16 16 16   Temp: 98.6 °F (37 °C)      TempSrc: Oral      SpO2:  94% 93% 94%   Weight:       Height:           EMERGENCY DEPARTMENT COURSE:    Medications   0.9 % sodium chloride infusion ( IntraVENous New Bag 6/4/22 1520)   ondansetron (ZOFRAN) injection 4 mg (4 mg IntraVENous Given 6/4/22 1501)   HYDROmorphone (DILAUDID) injection 0.5 mg (0.5 mg IntraVENous Given 6/4/22 1501)   piperacillin-tazobactam (ZOSYN) 3,375 mg in dextrose 5 % 50 mL IVPB (mini-bag) (0 mg IntraVENous Stopped 6/4/22 1623)   pantoprazole (PROTONIX) 40 mg in sodium chloride (PF) 10 mL injection (40 mg IntraVENous Given 6/4/22 1516)       The pt was seen and evaluated by me. Within the department, I observed the pt's vitalsigns to be within acceptable range. Laboratory and Radiological studies were performed, results were reviewed with the patient. Within the department, the pt was treated with IV fluid hydration, Zofran, Zosyn after all the cultures were taken, Protonix and Dilaudid. I observed the pt's condition to be hemodynamically stable during the duration of their stay. I explained my proposed course of treatment to the pt, and they were amenable to my decision. They case is referred to the hospitalist for admission, Dr. Kenrick Johnson and with consult to general surgery Dr. Anupam Snider:   None. CONSULTS:  Hospitalist Dr. Aleksey Helton surgery Dr. Maximiliano Rod:  None. FINAL IMPRESSION       1. Acute cholecystitis due to biliary calculus    2. Right upper quadrant abdominal pain    3. Leukocytosis, unspecified type    4. Acute cystitis with hematuria    5.  Calculus of gallbladder with acute cholecystitis without obstruction          DISPOSITION/PLAN  PATIENT REFERRED TO:  admitted to the hospitalist services. (Please note that portions of this note were completed with a voice recognition program and electronically transcribed. Efforts were [facility] edit the dictations but occasionally words are mis-transcribed . The transcription may contain errors not detected in proofreading.   This transcription was electronically signed.)     06/04/22 5:10 PM      Falguni Luong MD      Emergency room physician           Falguni Luong MD  06/04/22 5727

## 2022-06-04 NOTE — PLAN OF CARE
Problem: Discharge Planning  Goal: Discharge to home or other facility with appropriate resources  Outcome: Progressing  Flowsheets (Taken 6/4/2022 1830)  Discharge to home or other facility with appropriate resources: Identify barriers to discharge with patient and caregiver  Note: Will discharge home with  and kids when appropriate. Problem: Pain  Goal: Verbalizes/displays adequate comfort level or baseline comfort level  Outcome: Progressing  Note: Rates pain 5-6/10 right upper and lower abdomen.  Prn pain meds

## 2022-06-04 NOTE — ED PROVIDER NOTES
[address] [facility] [address] [address] EMERGENCY DEPT  Urgent Care Encounter       CHIEF COMPLAINT       Chief Complaint   Patient presents with    Abdominal Pain     right lower quadrant    Hematuria       Nurses Notes reviewed and I agree except as noted in the HPI. HISTORY OF PRESENT ILLNESS   Star Cadet is a 60 y.o. female who presents with complaints of right upper and lower abdominal pain, onset 2 days ago. Patient also reports blood in her urine. Patient states 2 days ago abdominal pain was more generalized but then settled into the right upper and lower quadrants. She did have several episodes of vomiting last night. She denies fever but does have occasional chills. Pain is constant 6/10. She denies dysuria, urgency and frequency. No change in bowel pattern. No new medications or any questionable or unusual foods. The history is provided by the patient. REVIEW OF SYSTEMS     Review of Systems   Constitutional: Positive for appetite change, chills and fatigue. Negative for fever. HENT: Negative. Respiratory: Negative for cough and shortness of breath. Cardiovascular: Negative for chest pain. Gastrointestinal: Positive for abdominal pain, nausea and vomiting. Negative for abdominal distention, blood in stool and diarrhea. Genitourinary: Positive for hematuria. Negative for decreased urine volume, dysuria, frequency and urgency. Musculoskeletal: Negative for myalgias. Skin: Negative for rash. Neurological: Negative for dizziness and headaches. PAST MEDICAL HISTORY         Diagnosis Date    Heart murmur        SURGICALHISTORY     Patient  has a past surgical history that includes knee surgery and Mandible surgery.     CURRENT MEDICATIONS       Current Discharge Medication List      CONTINUE these medications which have NOT CHANGED    Details   naproxen (NAPROSYN) 500 MG tablet Take 1 tablet by mouth 2 times daily  Qty: 60 tablet, Refills: 0      cyclobenzaprine (FLEXERIL) 10 MG tablet Take 1 tablet by mouth 3 times daily as needed for Muscle spasms  Qty: 15 tablet, Refills: 0             ALLERGIES     Patient is is allergic to sulfa antibiotics. Patients   There is no immunization history on file for this patient. FAMILY HISTORY     Patient's family history is not on file. SOCIAL HISTORY     Patient  reports that she has never smoked. She has never used smokeless tobacco. She reports that she does not drink alcohol and does not use drugs. PHYSICAL EXAM     ED TRIAGE VITALS  BP: 133/85, Temp: 97.7 °F (36.5 °C), Heart Rate: (!) 130, Resp: 16, SpO2: 95 %,Estimated body mass index is 42.25 kg/m² as calculated from the following:    Height as of this encounter: 5' 2\" (1.575 m). Weight as of this encounter: 231 lb (104.8 kg). ,No LMP recorded. Patient is postmenopausal.    Physical Exam  Vitals and nursing note reviewed. Constitutional:       General: She is not in acute distress. Appearance: She is well-developed. She is not ill-appearing. HENT:      Head: Normocephalic and atraumatic. Right Ear: Tympanic membrane and ear canal normal.      Left Ear: Tympanic membrane and ear canal normal.      Nose: Nose normal.      Mouth/Throat:      Lips: Pink. Mouth: Mucous membranes are moist.      Pharynx: Oropharynx is clear. No oropharyngeal exudate or posterior oropharyngeal erythema. Eyes:      General: Lids are normal. No scleral icterus. Conjunctiva/sclera:      Right eye: Right conjunctiva is not injected. Left eye: Left conjunctiva is not injected. Pupils: Pupils are equal.   Cardiovascular:      Rate and Rhythm: Regular rhythm. Tachycardia present. Heart sounds: Normal heart sounds, S1 normal and S2 normal.   Pulmonary:      Effort: Pulmonary effort is normal. No respiratory distress. Breath sounds: Normal breath sounds and air entry. Abdominal:      General: Bowel sounds are normal. There is distension. Palpations: Abdomen is soft. Tenderness: There is abdominal tenderness in the right upper quadrant and right lower quadrant. There is guarding (Mild guarding) and rebound. There is no right CVA tenderness or left CVA tenderness. Musculoskeletal:      Comments: Normal active ROM x 4 extremities  Gait steady   Lymphadenopathy:      Comments: No head or neck adenopathy   Skin:     General: Skin is warm and dry. Findings: No rash (to exposed areas of skin). Neurological:      General: No focal deficit present. Mental Status: She is alert and oriented to person, place, and time. Sensory: No sensory deficit. Comments: Answers questions readily and appropriately   Psychiatric:         Mood and Affect: Mood normal.         Speech: Speech normal.         Behavior: Behavior normal. Behavior is cooperative.          DIAGNOSTIC RESULTS     Labs:  Results for orders placed or performed during the hospital encounter of 06/04/22   Urinalysis   Result Value Ref Range    Glucose, Ur Negative NEGATIVE mg/dl    Bilirubin Urine Moderate (A) NEGATIVE    Ketones, Urine 15 NEGATIVE    Specific Gravity, Urine >= 1.030 1.002 - 1.030    Blood, Urine Large (A) NEGATIVE    pH, UA 5.50 5.0 - 9.0    Protein,  (A) NEGATIVE mg/dl    Urobilinogen, Urine 1.00 0.2 - 1.0 eu/dl    Nitrite, Urine Positive (A) NEGATIVE    Leukocyte Esterase, Urine Negative NEGATIVE    Color, UA Alba (A) STRAW-YELLOW    Character, Urine Slightly Cloudy CLEAR-SL CLOUD   CBC with Auto Differential   Result Value Ref Range    WBC 25.4 (H) 4.8 - 10.8 thou/mm3    RBC 4.60 4.20 - 5.40 mill/mm3    Hemoglobin 14.3 12.0 - 16.0 gm/dl    Hematocrit 41.9 37.0 - 47.0 %    MCV 91 81 - 99 fL    MCH 31.1 (H) 27.0 - 31.0 pg    MCHC 34.1 33.0 - 37.0 gm/dl    RDW 13.7 11.5 - 14.5 %    Platelets 987 (H) 610 - 400 thou/mm3    MPV 10.6 (H) 7.4 - 10.4 fL   POC Basic Metabol Panel without Calcium   Result Value Ref Range    Sodium 133 (L) 138 - 146 meq/l    Potassium, Whole Blood 3.8 3.5 - 4.9 meq/l    Chloride, Whole Blood 103 98 - 109 meq/l    TOTAL CO2, WHOLE BLOOD 23 23 - 33 meq/l    Glucose, Whole Blood 183 (H) 70 - 108 mg/dl    BUN, WHOLE BLOOD 20 8 - 26 mg/dl    CREATININE 0.5 0.5 - 1.2 mg/dl   Glomerular Filtration Rate, Estimated   Result Value Ref Range    GFR, Estimated > 90 ml/min/1.73m2       IMAGING:    CT ABDOMEN PELVIS WO CONTRAST Additional Contrast? None   Final Result      1. Findings of acute cholecystitis. There is extensive inflammatory haziness in the adjacent fat with reactive thickening of the adjacent portion of the colon. 2. There is a small hiatal hernia and there is fluid tracking superiorly into the esophagus. **This report has been created using voice recognition software. It may contain minor errors which are inherent in voice recognition technology. **      Final report electronically signed by Dr Oswald Ring on 6/4/2022 12:52 PM            EKG:      URGENT CARE COURSE:     Vitals:    06/04/22 1134   BP: 133/85   Pulse: (!) 130   Resp: 16   Temp: 97.7 °F (36.5 °C)   TempSrc: Oral   SpO2: 95%   Weight: 231 lb (104.8 kg)   Height: 5' 2\" (1.575 m)       Medications - No data to display         PROCEDURES:  None    FINAL IMPRESSION      1. Cholecystitis    2. Right upper quadrant abdominal pain    3. Right lower quadrant abdominal pain    4. Leukocytosis, unspecified type    5. Acute cystitis with hematuria          DISPOSITION/ PLAN     Patient presents with complaints of right upper and lower abdominal pain. UA does reveal a urinary tract infection with positive nitrites and hematuria. Patient's pain did not fit with this diagnosis so CT of the abdomen without contrast was completed. CT shows acute cholecystitis with extensive inflammatory haziness in the adjacent fat with thickening of the adjacent colon. WBC count 25.4. This could be an infective cholecystitis.   Explained this to the patient and recommend further evaluation in the emergency department and probable general surgery consult. This was explained to the patient who did verbalize understanding. Patient refused EMS transport and will travel to [place] [address] via private car with her daughter. All patient's questions were answered prior to discharge and she left the urgent care center in good condition. Report was called to STEPHON BURNHAM in the ER.       PATIENT REFERRED TO:  DO Shima Agrawal [address] [place] [facility] [address]      DISCHARGE MEDICATIONS:  Current Discharge Medication List          Current Discharge Medication List          Current Discharge Medication List          SAJI Barnes CNP    (Please note that portions of this note were completed with a voice recognition program. Efforts were made to edit the dictations but occasionally words are mis-transcribed.)         SAJI Barnes CNP  06/04/22 4626

## 2022-06-05 ENCOUNTER — ANESTHESIA EVENT (OUTPATIENT)
Dept: OPERATING ROOM | Age: 57
DRG: 415 | End: 2022-06-05

## 2022-06-05 ENCOUNTER — ANESTHESIA (OUTPATIENT)
Dept: OPERATING ROOM | Age: 57
DRG: 415 | End: 2022-06-05

## 2022-06-05 LAB
ALBUMIN SERPL-MCNC: 3.4 G/DL (ref 3.5–5.1)
ALP BLD-CCNC: 138 U/L (ref 38–126)
ALT SERPL-CCNC: 156 U/L (ref 11–66)
ANION GAP SERPL CALCULATED.3IONS-SCNC: 11 MEQ/L (ref 8–16)
AST SERPL-CCNC: 206 U/L (ref 5–40)
AVERAGE GLUCOSE: 96 MG/DL (ref 70–126)
BASOPHILS # BLD: 0.1 %
BASOPHILS ABSOLUTE: 0 THOU/MM3 (ref 0–0.1)
BILIRUB SERPL-MCNC: 2.8 MG/DL (ref 0.3–1.2)
BILIRUBIN DIRECT: 2 MG/DL (ref 0–0.3)
BUN BLDV-MCNC: 32 MG/DL (ref 7–22)
CALCIUM SERPL-MCNC: 9.4 MG/DL (ref 8.5–10.5)
CHLORIDE BLD-SCNC: 99 MEQ/L (ref 98–111)
CO2: 25 MEQ/L (ref 23–33)
CREAT SERPL-MCNC: 0.7 MG/DL (ref 0.4–1.2)
EOSINOPHIL # BLD: 0 %
EOSINOPHILS ABSOLUTE: 0 THOU/MM3 (ref 0–0.4)
ERYTHROCYTE [DISTWIDTH] IN BLOOD BY AUTOMATED COUNT: 13.2 % (ref 11.5–14.5)
ERYTHROCYTE [DISTWIDTH] IN BLOOD BY AUTOMATED COUNT: 44.2 FL (ref 35–45)
GFR SERPL CREATININE-BSD FRML MDRD: 86 ML/MIN/1.73M2
GLUCOSE BLD-MCNC: 117 MG/DL (ref 70–108)
GLUCOSE BLD-MCNC: 131 MG/DL (ref 70–108)
GLUCOSE BLD-MCNC: 136 MG/DL (ref 70–108)
GLUCOSE BLD-MCNC: 137 MG/DL (ref 70–108)
GLUCOSE BLD-MCNC: 144 MG/DL (ref 70–108)
GLUCOSE BLD-MCNC: 163 MG/DL (ref 70–108)
HBA1C MFR BLD: 5.2 % (ref 4.4–6.4)
HCT VFR BLD CALC: 37.5 % (ref 37–47)
HEMOGLOBIN: 12.2 GM/DL (ref 12–16)
IMMATURE GRANS (ABS): 0.15 THOU/MM3 (ref 0–0.07)
IMMATURE GRANULOCYTES: 0.6 %
LACTIC ACID: 0.9 MMOL/L (ref 0.5–2)
LYMPHOCYTES # BLD: 5 %
LYMPHOCYTES ABSOLUTE: 1.2 THOU/MM3 (ref 1–4.8)
MCH RBC QN AUTO: 29.8 PG (ref 26–33)
MCHC RBC AUTO-ENTMCNC: 32.5 GM/DL (ref 32.2–35.5)
MCV RBC AUTO: 91.7 FL (ref 81–99)
MONOCYTES # BLD: 9.5 %
MONOCYTES ABSOLUTE: 2.3 THOU/MM3 (ref 0.4–1.3)
NUCLEATED RED BLOOD CELLS: 0 /100 WBC
ORGANISM: ABNORMAL
PLATELET # BLD: 368 THOU/MM3 (ref 130–400)
PMV BLD AUTO: 10.5 FL (ref 9.4–12.4)
POTASSIUM REFLEX MAGNESIUM: 4.3 MEQ/L (ref 3.5–5.2)
RBC # BLD: 4.09 MILL/MM3 (ref 4.2–5.4)
SEG NEUTROPHILS: 84.8 %
SEGMENTED NEUTROPHILS ABSOLUTE COUNT: 20.4 THOU/MM3 (ref 1.8–7.7)
SODIUM BLD-SCNC: 135 MEQ/L (ref 135–145)
TOTAL PROTEIN: 6.4 G/DL (ref 6.1–8)
URINE CULTURE REFLEX: ABNORMAL
WBC # BLD: 24.1 THOU/MM3 (ref 4.8–10.8)

## 2022-06-05 PROCEDURE — 2580000003 HC RX 258: Performed by: INTERNAL MEDICINE

## 2022-06-05 PROCEDURE — 3700000001 HC ADD 15 MINUTES (ANESTHESIA): Performed by: SURGERY

## 2022-06-05 PROCEDURE — 2580000003 HC RX 258: Performed by: SURGERY

## 2022-06-05 PROCEDURE — 2720000010 HC SURG SUPPLY STERILE: Performed by: SURGERY

## 2022-06-05 PROCEDURE — 2500000003 HC RX 250 WO HCPCS: Performed by: SURGERY

## 2022-06-05 PROCEDURE — 6360000002 HC RX W HCPCS: Performed by: SURGERY

## 2022-06-05 PROCEDURE — 2580000003 HC RX 258: Performed by: EMERGENCY MEDICINE

## 2022-06-05 PROCEDURE — 7100000001 HC PACU RECOVERY - ADDTL 15 MIN: Performed by: SURGERY

## 2022-06-05 PROCEDURE — 94760 N-INVAS EAR/PLS OXIMETRY 1: CPT

## 2022-06-05 PROCEDURE — 3600000019 HC SURGERY ROBOT ADDTL 15MIN: Performed by: SURGERY

## 2022-06-05 PROCEDURE — 2700000000 HC OXYGEN THERAPY PER DAY

## 2022-06-05 PROCEDURE — 6370000000 HC RX 637 (ALT 250 FOR IP): Performed by: INTERNAL MEDICINE

## 2022-06-05 PROCEDURE — S2900 ROBOTIC SURGICAL SYSTEM: HCPCS | Performed by: SURGERY

## 2022-06-05 PROCEDURE — 82948 REAGENT STRIP/BLOOD GLUCOSE: CPT

## 2022-06-05 PROCEDURE — 8E0W0CZ ROBOTIC ASSISTED PROCEDURE OF TRUNK REGION, OPEN APPROACH: ICD-10-PCS | Performed by: SURGERY

## 2022-06-05 PROCEDURE — 88304 TISSUE EXAM BY PATHOLOGIST: CPT

## 2022-06-05 PROCEDURE — 2060000000 HC ICU INTERMEDIATE R&B

## 2022-06-05 PROCEDURE — 47600 CHOLECYSTECTOMY: CPT | Performed by: SURGERY

## 2022-06-05 PROCEDURE — 2709999900 HC NON-CHARGEABLE SUPPLY: Performed by: SURGERY

## 2022-06-05 PROCEDURE — 7100000000 HC PACU RECOVERY - FIRST 15 MIN: Performed by: SURGERY

## 2022-06-05 PROCEDURE — 0FT40ZZ RESECTION OF GALLBLADDER, OPEN APPROACH: ICD-10-PCS | Performed by: SURGERY

## 2022-06-05 PROCEDURE — 6360000002 HC RX W HCPCS

## 2022-06-05 PROCEDURE — 80076 HEPATIC FUNCTION PANEL: CPT

## 2022-06-05 PROCEDURE — 6360000002 HC RX W HCPCS: Performed by: INTERNAL MEDICINE

## 2022-06-05 PROCEDURE — 2500000003 HC RX 250 WO HCPCS

## 2022-06-05 PROCEDURE — 83605 ASSAY OF LACTIC ACID: CPT

## 2022-06-05 PROCEDURE — 85025 COMPLETE CBC W/AUTO DIFF WBC: CPT

## 2022-06-05 PROCEDURE — 3700000000 HC ANESTHESIA ATTENDED CARE: Performed by: SURGERY

## 2022-06-05 PROCEDURE — 3600000009 HC SURGERY ROBOT BASE: Performed by: SURGERY

## 2022-06-05 PROCEDURE — 36415 COLL VENOUS BLD VENIPUNCTURE: CPT

## 2022-06-05 PROCEDURE — 80048 BASIC METABOLIC PNL TOTAL CA: CPT

## 2022-06-05 PROCEDURE — C1729 CATH, DRAINAGE: HCPCS | Performed by: SURGERY

## 2022-06-05 RX ORDER — ENOXAPARIN SODIUM 100 MG/ML
30 INJECTION SUBCUTANEOUS 2 TIMES DAILY
Status: DISCONTINUED | OUTPATIENT
Start: 2022-06-06 | End: 2022-06-08 | Stop reason: HOSPADM

## 2022-06-05 RX ORDER — DIPHENHYDRAMINE HYDROCHLORIDE 50 MG/ML
12.5 INJECTION INTRAMUSCULAR; INTRAVENOUS
Status: DISCONTINUED | OUTPATIENT
Start: 2022-06-05 | End: 2022-06-05 | Stop reason: HOSPADM

## 2022-06-05 RX ORDER — FENTANYL CITRATE 50 UG/ML
50 INJECTION, SOLUTION INTRAMUSCULAR; INTRAVENOUS EVERY 5 MIN PRN
Status: DISCONTINUED | OUTPATIENT
Start: 2022-06-05 | End: 2022-06-05 | Stop reason: HOSPADM

## 2022-06-05 RX ORDER — SODIUM CHLORIDE 0.9 % (FLUSH) 0.9 %
5-40 SYRINGE (ML) INJECTION EVERY 12 HOURS SCHEDULED
Status: DISCONTINUED | OUTPATIENT
Start: 2022-06-05 | End: 2022-06-05 | Stop reason: HOSPADM

## 2022-06-05 RX ORDER — FENTANYL CITRATE 50 UG/ML
25 INJECTION, SOLUTION INTRAMUSCULAR; INTRAVENOUS EVERY 5 MIN PRN
Status: DISCONTINUED | OUTPATIENT
Start: 2022-06-05 | End: 2022-06-05 | Stop reason: HOSPADM

## 2022-06-05 RX ORDER — DEXAMETHASONE SODIUM PHOSPHATE 10 MG/ML
INJECTION, EMULSION INTRAMUSCULAR; INTRAVENOUS PRN
Status: DISCONTINUED | OUTPATIENT
Start: 2022-06-05 | End: 2022-06-05 | Stop reason: SDUPTHER

## 2022-06-05 RX ORDER — PROPOFOL 10 MG/ML
INJECTION, EMULSION INTRAVENOUS PRN
Status: DISCONTINUED | OUTPATIENT
Start: 2022-06-05 | End: 2022-06-05 | Stop reason: SDUPTHER

## 2022-06-05 RX ORDER — HYDROMORPHONE HCL 110MG/55ML
PATIENT CONTROLLED ANALGESIA SYRINGE INTRAVENOUS PRN
Status: DISCONTINUED | OUTPATIENT
Start: 2022-06-05 | End: 2022-06-05 | Stop reason: SDUPTHER

## 2022-06-05 RX ORDER — BUPIVACAINE HYDROCHLORIDE 5 MG/ML
INJECTION, SOLUTION EPIDURAL; INTRACAUDAL PRN
Status: DISCONTINUED | OUTPATIENT
Start: 2022-06-05 | End: 2022-06-05 | Stop reason: ALTCHOICE

## 2022-06-05 RX ORDER — SODIUM CHLORIDE 0.9 % (FLUSH) 0.9 %
5-40 SYRINGE (ML) INJECTION PRN
Status: DISCONTINUED | OUTPATIENT
Start: 2022-06-05 | End: 2022-06-05 | Stop reason: HOSPADM

## 2022-06-05 RX ORDER — INDOCYANINE GREEN AND WATER 25 MG
KIT INJECTION PRN
Status: DISCONTINUED | OUTPATIENT
Start: 2022-06-05 | End: 2022-06-05 | Stop reason: HOSPADM

## 2022-06-05 RX ORDER — SODIUM CHLORIDE 9 MG/ML
INJECTION, SOLUTION INTRAVENOUS PRN
Status: DISCONTINUED | OUTPATIENT
Start: 2022-06-05 | End: 2022-06-05 | Stop reason: HOSPADM

## 2022-06-05 RX ORDER — SUCCINYLCHOLINE/SOD CL,ISO/PF 200MG/10ML
SYRINGE (ML) INTRAVENOUS PRN
Status: DISCONTINUED | OUTPATIENT
Start: 2022-06-05 | End: 2022-06-05 | Stop reason: SDUPTHER

## 2022-06-05 RX ORDER — ONDANSETRON 2 MG/ML
INJECTION INTRAMUSCULAR; INTRAVENOUS PRN
Status: DISCONTINUED | OUTPATIENT
Start: 2022-06-05 | End: 2022-06-05 | Stop reason: SDUPTHER

## 2022-06-05 RX ORDER — ROCURONIUM BROMIDE 10 MG/ML
INJECTION, SOLUTION INTRAVENOUS PRN
Status: DISCONTINUED | OUTPATIENT
Start: 2022-06-05 | End: 2022-06-05 | Stop reason: SDUPTHER

## 2022-06-05 RX ORDER — MIDAZOLAM HYDROCHLORIDE 1 MG/ML
INJECTION INTRAMUSCULAR; INTRAVENOUS PRN
Status: DISCONTINUED | OUTPATIENT
Start: 2022-06-05 | End: 2022-06-05 | Stop reason: SDUPTHER

## 2022-06-05 RX ORDER — ESMOLOL HYDROCHLORIDE 10 MG/ML
INJECTION INTRAVENOUS PRN
Status: DISCONTINUED | OUTPATIENT
Start: 2022-06-05 | End: 2022-06-05 | Stop reason: SDUPTHER

## 2022-06-05 RX ORDER — METOCLOPRAMIDE HYDROCHLORIDE 5 MG/ML
10 INJECTION INTRAMUSCULAR; INTRAVENOUS
Status: DISCONTINUED | OUTPATIENT
Start: 2022-06-05 | End: 2022-06-05 | Stop reason: HOSPADM

## 2022-06-05 RX ORDER — FENTANYL CITRATE 50 UG/ML
INJECTION, SOLUTION INTRAMUSCULAR; INTRAVENOUS PRN
Status: DISCONTINUED | OUTPATIENT
Start: 2022-06-05 | End: 2022-06-05 | Stop reason: SDUPTHER

## 2022-06-05 RX ORDER — KETOROLAC TROMETHAMINE 30 MG/ML
INJECTION, SOLUTION INTRAMUSCULAR; INTRAVENOUS PRN
Status: DISCONTINUED | OUTPATIENT
Start: 2022-06-05 | End: 2022-06-05 | Stop reason: SDUPTHER

## 2022-06-05 RX ORDER — LIDOCAINE HYDROCHLORIDE 20 MG/ML
INJECTION, SOLUTION INTRAVENOUS PRN
Status: DISCONTINUED | OUTPATIENT
Start: 2022-06-05 | End: 2022-06-05 | Stop reason: SDUPTHER

## 2022-06-05 RX ADMIN — ROCURONIUM BROMIDE 10 MG: 50 INJECTION, SOLUTION INTRAVENOUS at 11:24

## 2022-06-05 RX ADMIN — INSULIN LISPRO 1 UNITS: 100 INJECTION, SOLUTION INTRAVENOUS; SUBCUTANEOUS at 00:26

## 2022-06-05 RX ADMIN — PIPERACILLIN AND TAZOBACTAM 3375 MG: 3; .375 INJECTION, POWDER, LYOPHILIZED, FOR SOLUTION INTRAVENOUS at 18:45

## 2022-06-05 RX ADMIN — PIPERACILLIN AND TAZOBACTAM 3375 MG: 3; .375 INJECTION, POWDER, LYOPHILIZED, FOR SOLUTION INTRAVENOUS at 10:19

## 2022-06-05 RX ADMIN — SUGAMMADEX 200 MG: 100 INJECTION, SOLUTION INTRAVENOUS at 12:07

## 2022-06-05 RX ADMIN — SODIUM CHLORIDE: 9 INJECTION, SOLUTION INTRAVENOUS at 03:43

## 2022-06-05 RX ADMIN — FENTANYL CITRATE 100 MCG: 50 INJECTION, SOLUTION INTRAMUSCULAR; INTRAVENOUS at 10:06

## 2022-06-05 RX ADMIN — HYDROMORPHONE HYDROCHLORIDE 0.5 MG: 2 INJECTION INTRAMUSCULAR; INTRAVENOUS; SUBCUTANEOUS at 11:26

## 2022-06-05 RX ADMIN — PIPERACILLIN AND TAZOBACTAM 3375 MG: 3; .375 INJECTION, POWDER, LYOPHILIZED, FOR SOLUTION INTRAVENOUS at 14:14

## 2022-06-05 RX ADMIN — Medication 160 MG: at 10:08

## 2022-06-05 RX ADMIN — HYDROMORPHONE HYDROCHLORIDE 0.5 MG: 1 INJECTION, SOLUTION INTRAMUSCULAR; INTRAVENOUS; SUBCUTANEOUS at 00:14

## 2022-06-05 RX ADMIN — MIDAZOLAM 2 MG: 1 INJECTION INTRAMUSCULAR; INTRAVENOUS at 10:06

## 2022-06-05 RX ADMIN — SODIUM CHLORIDE: 9 INJECTION, SOLUTION INTRAVENOUS at 10:44

## 2022-06-05 RX ADMIN — HYDROMORPHONE HYDROCHLORIDE 0.5 MG: 1 INJECTION, SOLUTION INTRAMUSCULAR; INTRAVENOUS; SUBCUTANEOUS at 20:48

## 2022-06-05 RX ADMIN — ESMOLOL HYDROCHLORIDE 30 MG: 10 INJECTION, SOLUTION INTRAVENOUS at 10:49

## 2022-06-05 RX ADMIN — ROCURONIUM BROMIDE 10 MG: 50 INJECTION, SOLUTION INTRAVENOUS at 10:32

## 2022-06-05 RX ADMIN — FENTANYL CITRATE 50 MCG: 50 INJECTION, SOLUTION INTRAMUSCULAR; INTRAVENOUS at 10:35

## 2022-06-05 RX ADMIN — KETOROLAC TROMETHAMINE 30 MG: 30 INJECTION, SOLUTION INTRAMUSCULAR; INTRAVENOUS at 12:07

## 2022-06-05 RX ADMIN — SODIUM CHLORIDE, PRESERVATIVE FREE 40 ML: 5 INJECTION INTRAVENOUS at 20:42

## 2022-06-05 RX ADMIN — FENTANYL CITRATE 50 MCG: 50 INJECTION, SOLUTION INTRAMUSCULAR; INTRAVENOUS at 10:32

## 2022-06-05 RX ADMIN — LIDOCAINE HYDROCHLORIDE 100 MG: 20 INJECTION, SOLUTION INTRAVENOUS at 10:08

## 2022-06-05 RX ADMIN — PIPERACILLIN AND TAZOBACTAM 3375 MG: 3; .375 INJECTION, POWDER, LYOPHILIZED, FOR SOLUTION INTRAVENOUS at 06:39

## 2022-06-05 RX ADMIN — HYDROMORPHONE HYDROCHLORIDE 0.5 MG: 1 INJECTION, SOLUTION INTRAMUSCULAR; INTRAVENOUS; SUBCUTANEOUS at 14:10

## 2022-06-05 RX ADMIN — SODIUM CHLORIDE: 9 INJECTION, SOLUTION INTRAVENOUS at 13:30

## 2022-06-05 RX ADMIN — FAMOTIDINE 20 MG: 10 INJECTION, SOLUTION INTRAVENOUS at 20:49

## 2022-06-05 RX ADMIN — PROPOFOL 180 MG: 10 INJECTION, EMULSION INTRAVENOUS at 10:08

## 2022-06-05 RX ADMIN — HYDROMORPHONE HYDROCHLORIDE 0.5 MG: 2 INJECTION INTRAMUSCULAR; INTRAVENOUS; SUBCUTANEOUS at 11:43

## 2022-06-05 RX ADMIN — ONDANSETRON 4 MG: 2 INJECTION INTRAMUSCULAR; INTRAVENOUS at 03:36

## 2022-06-05 RX ADMIN — ROCURONIUM BROMIDE 40 MG: 50 INJECTION, SOLUTION INTRAVENOUS at 10:23

## 2022-06-05 RX ADMIN — HYDROMORPHONE HYDROCHLORIDE 1 MG: 2 INJECTION INTRAMUSCULAR; INTRAVENOUS; SUBCUTANEOUS at 12:07

## 2022-06-05 RX ADMIN — DEXAMETHASONE SODIUM PHOSPHATE 10 MG: 10 INJECTION, EMULSION INTRAMUSCULAR; INTRAVENOUS at 10:42

## 2022-06-05 RX ADMIN — ONDANSETRON 4 MG: 2 INJECTION INTRAMUSCULAR; INTRAVENOUS at 11:55

## 2022-06-05 RX ADMIN — HYDROMORPHONE HYDROCHLORIDE 0.5 MG: 1 INJECTION, SOLUTION INTRAMUSCULAR; INTRAVENOUS; SUBCUTANEOUS at 03:36

## 2022-06-05 ASSESSMENT — PAIN DESCRIPTION - DESCRIPTORS
DESCRIPTORS: DISCOMFORT
DESCRIPTORS: ACHING

## 2022-06-05 ASSESSMENT — PAIN SCALES - GENERAL
PAINLEVEL_OUTOF10: 7
PAINLEVEL_OUTOF10: 0
PAINLEVEL_OUTOF10: 4
PAINLEVEL_OUTOF10: 0
PAINLEVEL_OUTOF10: 3

## 2022-06-05 ASSESSMENT — PAIN DESCRIPTION - ORIENTATION
ORIENTATION: RIGHT
ORIENTATION: RIGHT

## 2022-06-05 ASSESSMENT — PAIN - FUNCTIONAL ASSESSMENT
PAIN_FUNCTIONAL_ASSESSMENT: PREVENTS OR INTERFERES SOME ACTIVE ACTIVITIES AND ADLS
PAIN_FUNCTIONAL_ASSESSMENT: PREVENTS OR INTERFERES SOME ACTIVE ACTIVITIES AND ADLS

## 2022-06-05 ASSESSMENT — PAIN DESCRIPTION - FREQUENCY
FREQUENCY: CONTINUOUS
FREQUENCY: CONTINUOUS

## 2022-06-05 ASSESSMENT — PAIN DESCRIPTION - ONSET
ONSET: ON-GOING
ONSET: ON-GOING

## 2022-06-05 ASSESSMENT — PAIN DESCRIPTION - PAIN TYPE
TYPE: SURGICAL PAIN
TYPE: ACUTE PAIN

## 2022-06-05 ASSESSMENT — PAIN DESCRIPTION - LOCATION
LOCATION: HEAD;ABDOMEN
LOCATION: ABDOMEN
LOCATION: ABDOMEN

## 2022-06-05 NOTE — BRIEF OP NOTE
Brief Postoperative Note      Patient: Davis Zhao  YOB: 1965  MRN: [id number]    Date of Procedure: 6/5/2022    Pre-Op Diagnosis: Calculus cholecystitis    Post-Op Diagnosis: Acute cholecystitis with gangrene of gallbladder wall       Procedure(s):  ROBOTIC CHOLECYSTECTOMY CONVERTED TO OPEN PROCEDURE    Surgeon(s):  Dandre Wilson MD    Assistant:  * No surgical staff found *    Anesthesia: General    Estimated Blood Loss (mL): less than 328     Complications: None    Specimens:   ID Type Source Tests Collected by Time Destination   A : Gallbladder Tissue Gallbladder SURGICAL PATHOLOGY Dandre Wilson MD 6/5/2022 1030        Implants:  * No implants in log *      Drains:   Closed/Suction Drain Right; Anterior RLQ Bulb (Active)       Findings: Massively distended gallbladder packed with stones gangrene of the wall inflammatory stranding reactive tissue    Electronically signed by Dandre Wilson MD on 6/5/2022 at 12:09 PM

## 2022-06-05 NOTE — ANESTHESIA POSTPROCEDURE EVALUATION
Department of Anesthesiology  Postprocedure Note    Patient: Gabriel Tyson  MRN: [id number]  YOB: 1965  Date of evaluation: 6/5/2022  Time:  6:03 PM     Procedure Summary     Date: 06/05/22 Room / Location: [address] [place] [address]    Anesthesia Start: 1005 Anesthesia Stop: 3913    Procedure: ROBOTIC CHOLECYSTECTOMY CONVERTED TO OPEN PROCEDURE (N/A Abdomen) Diagnosis:       Generalized abdominal pain      (abd pain)    Surgeons: Mehdi Saenz MD Responsible Provider: Randal Wheatley MD    Anesthesia Type: general ASA Status: 3          Anesthesia Type: No value filed. Marcell Phase I: Marcell Score: 8    Marcell Phase II:      Last vitals: Reviewed and per EMR flowsheets. Anesthesia Post Evaluation    Patient location during evaluation: PACU  Patient participation: complete - patient participated  Level of consciousness: awake and alert  Airway patency: patent  Nausea & Vomiting: no nausea and no vomiting  Complications: no  Cardiovascular status: hemodynamically stable  Respiratory status: acceptable  Hydration status: euvolemic      [facility] [facility]  POST-ANESTHESIA NOTE       Name:  Gabriel Tyson                                         Age:  60 y.o.   MRN:  [id number]      Last Vitals:  BP (!) 105/57   Pulse 85   Temp 98 °F (36.7 °C) (Oral)   Resp 18   Ht 5' 2\" (1.575 m)   Wt 231 lb (104.8 kg)   SpO2 95%   BMI 42.25 kg/m²   Patient Vitals for the past 4 hrs:   Resp SpO2   06/05/22 1632 18 95 %       Level of Consciousness:  Awake    Respiratory:  Stable    Oxygen Saturation:  Stable    Cardiovascular:  Stable    Hydration:  Adequate    PONV:  Stable    Post-op Pain:  Adequate analgesia    Post-op Assessment:  No apparent anesthetic complications    Additional Follow-Up / Treatment / Comment:  None    Milad Blair MD  June 5, 2022   6:03 PM

## 2022-06-05 NOTE — PLAN OF CARE
Problem: Discharge Planning  Goal: Discharge to home or other facility with appropriate resources  6/4/2022 2250 by Carmen Acosta RN  Outcome: Progressing  Flowsheets  Taken 6/4/2022 2250  Discharge to home or other facility with appropriate resources:   Identify barriers to discharge with patient and caregiver   Arrange for needed discharge resources and transportation as appropriate   Identify discharge learning needs (meds, wound care, etc)   Arrange for interpreters to assist at discharge as needed  Taken 6/4/2022 1948  Discharge to home or other facility with appropriate resources:   Identify barriers to discharge with patient and caregiver   Arrange for needed discharge resources and transportation as appropriate   Identify discharge learning needs (meds, wound care, etc)     Problem: Pain  Goal: Verbalizes/displays adequate comfort level or baseline comfort level  6/4/2022 2250 by Carmen Acosta RN  Outcome: Progressing  Flowsheets (Taken 6/4/2022 2250)  Verbalizes/displays adequate comfort level or baseline comfort level:   Encourage patient to monitor pain and request assistance   Assess pain using appropriate pain scale   Administer analgesics based on type and severity of pain and evaluate response   Implement non-pharmacological measures as appropriate and evaluate response  Note: Educate patient on pain control. Educate patient on acceptable pain level with chronic pain. Talk to patient about non-pharmaceutical pain interventions.        Problem: Gastrointestinal - Adult  Goal: Minimal or absence of nausea and vomiting  Outcome: Progressing  Flowsheets (Taken 6/4/2022 2250)  Minimal or absence of nausea and vomiting:   Administer IV fluids as ordered to ensure adequate hydration   Administer ordered antiemetic medications as needed     Problem: Gastrointestinal - Adult  Goal: Maintains or returns to baseline bowel function  Outcome: Progressing  Flowsheets (Taken 6/4/2022 2250)  Maintains or

## 2022-06-05 NOTE — PLAN OF CARE
Problem: Discharge Planning  Goal: Discharge to home or other facility with appropriate resources  6/5/2022 1905 by López Isaac RN  Outcome: Annetta Norman (Taken 6/5/2022 1905)  Discharge to home or other facility with appropriate resources:   Identify barriers to discharge with patient and caregiver   Identify discharge learning needs (meds, wound care, etc)   Refer to discharge planning if patient needs post-hospital services based on physician order or complex needs related to functional status, cognitive ability or social support system   Arrange for needed discharge resources and transportation as appropriate     Problem: Pain  Goal: Verbalizes/displays adequate comfort level or baseline comfort level  6/5/2022 1905 by López Isaac RN  Outcome: Progressing  Flowsheets (Taken 6/5/2022 0930)  Verbalizes/displays adequate comfort level or baseline comfort level:   Encourage patient to monitor pain and request assistance   Assess pain using appropriate pain scale   Administer analgesics based on type and severity of pain and evaluate response   Implement non-pharmacological measures as appropriate and evaluate response     Problem: Gastrointestinal - Adult  Goal: Minimal or absence of nausea and vomiting  6/5/2022 1905 by López Isaac RN  Outcome: Progressing  Flowsheets (Taken 6/5/2022 1905)  Minimal or absence of nausea and vomiting:   Administer IV fluids as ordered to ensure adequate hydration   Provide nonpharmacologic comfort measures as appropriate   Administer ordered antiemetic medications as needed     Problem: Gastrointestinal - Adult  Goal: Maintains or returns to baseline bowel function  6/5/2022 1905 by López Isaac RN  Outcome: Progressing  Flowsheets (Taken 6/5/2022 1905)  Maintains or returns to baseline bowel function:   Assess bowel function   Administer IV fluids as ordered to ensure adequate hydration   Encourage mobilization and activity   Encourage oral fluids to ensure adequate hydration   Administer ordered medications as needed     Problem: Infection - Adult  Goal: Absence of infection at discharge  6/5/2022 1905 by Patel Kennedy RN  Outcome: Progressing  Flowsheets (Taken 6/5/2022 1905)  Absence of infection at discharge:   Assess and monitor for signs and symptoms of infection   Monitor lab/diagnostic results   Monitor all insertion sites i.e., indwelling lines, tubes and drains   Administer medications as ordered   Instruct and encourage patient and family to use good hand hygiene technique     Problem: Infection - Adult  Goal: Absence of infection during hospitalization  6/5/2022 1905 by Patel Kennedy RN  Outcome: Progressing  Flowsheets (Taken 6/5/2022 1905)  Absence of infection during hospitalization:   Assess and monitor for signs and symptoms of infection   Monitor lab/diagnostic results   Monitor all insertion sites i.e., indwelling lines, tubes and drains   Administer medications as ordered   Instruct and encourage patient and family to use good hand hygiene technique     Problem: ABCDS Injury Assessment  Goal: Absence of physical injury  6/5/2022 1905 by Patel Kennedy RN  Outcome: Progressing  Flowsheets (Taken 6/5/2022 1905)  Absence of Physical Injury: Implement safety measures based on patient assessment     Care plan reviewed with patient and family. Patient and family verbalize understanding of the plan of care and contribute to goal setting.

## 2022-06-05 NOTE — PROGRESS NOTES
Hospitalist      Patient:  Jaquelin Davis    Unit/Bed:4K-10/010-A  YOB: 1965  MRN: [id number]   Acct: [de-identified]     PCP: Bruno Mayorga DO  Date of Admission: 6/4/2022        Assessment and Plan:        1. Acute cholecystitis due to biliary calculus: General surgery has been consulted, will start on IV fluids, IV antibiotic, blood cultures, daily CBC with differential and BMP, EKG (potential preop need), ? MRCP  2. Right upper quadrant abdominal pain secondary to #1  3. Leukocytosis/lactic acidosis/tachycardia- SIRS  4. Acute cystitis with hematuria: We will obtain urine culture, patient is currently on Zosyn  5. Hyperglycemia we will check hemoglobin A1c, and urine for microalbumin  6. Nausea and vomiting: Secondary to #1, IV fluids, antiemetics      6.5.2022 patient seen this a.m. states she had a difficult night secondary to pain. Patient states that surgery is planned for 11 AM today. Nonsignificant improvement in WBC still markedly elevated, increased alk phos ALT AST and bilirubin      CC: Right upper quadrant of abdomen pain/blood in urine    HPI: 78-year-old obese white female who presents for evaluation of right upper quadrant abdominal pain for the past 2 nights. Patient's pain initially is on the upper abdomen and goes across from the epigastric area to the left to right, pain has been intermittent. Yesterday the the patient has been vomiting all day and today now the pain is consistently on the right quadrant of the abdomen. Denies any fever however had chills, patient also noted blood in her urine however denies any dysuria, frequency, urgency, no vaginal discharge or vaginal bleeding. Patient's pain is 5 out of 10 gets worse as time goes on 10/10 when she does movements or lying down on her back.   Denies any constipation, patient is been seen in urgent care and has laboratory data showing white count of 25,000 and a CT scan of the abdomen showing acute Evie cystitis and urine positive for nitrates. No past medical history    ROS (14 point review of systems completed. Pertinent positives noted. Otherwise ROS is negative) : Abdominal pain, nausea and vomiting hematuria    PMH:  Per HPI and       Diagnosis Date    Heart murmur      SHX:        Procedure Laterality Date    KNEE SURGERY      MANDIBLE SURGERY       FHX: History reviewed. No pertinent family history. SOCHX:   Social History     Socioeconomic History    Marital status:      Spouse name: None    Number of children: None    Years of education: None    Highest education level: None   Occupational History    None   Tobacco Use    Smoking status: Never Smoker    Smokeless tobacco: Never Used   Vaping Use    Vaping Use: Never used   Substance and Sexual Activity    Alcohol use: No    Drug use: No    Sexual activity: None   Other Topics Concern    None   Social History Narrative    None     Social Determinants of Health     Financial Resource Strain:     Difficulty of Paying Living Expenses: Not on file   Food Insecurity:     Worried About Running Out of Food in the Last Year: Not on file    Ti of Food in the Last Year: Not on file   Transportation Needs:     Lack of Transportation (Medical): Not on file    Lack of Transportation (Non-Medical):  Not on file   Physical Activity:     Days of Exercise per Week: Not on file    Minutes of Exercise per Session: Not on file   Stress:     Feeling of Stress : Not on file   Social Connections:     Frequency of Communication with Friends and Family: Not on file    Frequency of Social Gatherings with Friends and Family: Not on file    Attends [employer] Services: Not on file    Active Member of Clubs or Organizations: Not on file    Attends Club or Organization Meetings: Not on file    Marital Status: Not on file   Intimate Partner Violence:     Fear of Current or Ex-Partner: Not on file    Emotionally Abused: Not on file    Physically Abused: Not on file    Sexually Abused: Not on file   Housing Stability:     Unable to Pay for Housing in the Last Year: Not on file    Number of Places Lived in the Last Year: Not on file    Unstable Housing in the Last Year: Not on file      Allergies: Sulfa antibiotics  Medications:     sodium chloride 100 mL/hr at 06/04/22 1520    dextrose      sodium chloride      sodium chloride 100 mL/hr at 06/05/22 0343      sodium chloride flush  5-40 mL IntraVENous 2 times per day    enoxaparin  30 mg SubCUTAneous BID    famotidine (PEPCID) injection  20 mg IntraVENous BID    insulin lispro  0-6 Units SubCUTAneous Q4H    piperacillin-tazobactam  3,375 mg IntraVENous Q8H    indocyanine green  1.25 mg IntraVENous Once     Prior to Admission medications    Medication Sig Start Date End Date Taking? Authorizing Provider   naproxen (NAPROSYN) 500 MG tablet Take 1 tablet by mouth 2 times daily  Patient not taking: Reported on 6/4/2022 9/8/17   Dinorah Bentley MD   cyclobenzaprine (FLEXERIL) 10 MG tablet Take 1 tablet by mouth 3 times daily as needed for Muscle spasms  Patient not taking: Reported on 6/4/2022 9/8/17   Dinorah Bentley MD      PHYSICAL EXAM:    /70   Pulse (!) 117   Temp 99 °F (37.2 °C) (Oral)   Resp 18   Ht 5' 2\" (1.575 m)   Wt 231 lb (104.8 kg)   SpO2 95%   BMI 42.25 kg/m²     General appearance:  No apparent distress, appears stated age and cooperative. HEENT:  Normal cephalic, atraumatic without obvious deformity. Pupils equal, round, and reactive to light. Extra ocular muscles intact. Conjunctivae/corneas clear. Neck: Supple, with full range of motion. no jugular venous distention. Trachea midline. no carotid bruits  Respiratory:  Normal respiratory effort. Clear to auscultation, bilaterally without Rales/Wheezes/Rhonchi. Breath sounds equal bilaterally  Cardiovascular:  Regular rate and rhythm with normal S1/S2 without murmurs, rubs or gallops.  PMI non displaced  Abdomen: Soft, RUQ tender, non-distended with normal bowel sounds. No guarding, rebound. Musculoskeletal:  No clubbing, cyanosis or edema bilaterally. Full range of motion without deformity. Skin: Skin color, texture, turgor normal.  No rashes or lesions, or suspicious lesions. Neurologic:  Neurovascularly intact without any focal sensory/motor deficits. Cranial nerves: II-XII intact, grossly non-focal.  Psychiatric:  Alert and oriented, thought content appropriate, normal insight  Capillary Refill: Brisk,< 2 seconds   Peripheral Pulses: +2 palpable, equal bilaterally upper and lower extremities  Lymphatics: no lymphadenopathy    Data: (All radiographs, tracings, PFTs, and imaging are personally viewed and interpreted unless otherwise noted).    Recent Labs     06/04/22  1211 06/05/22  0340   WBC 25.4* 24.1*   HGB 14.3 12.2   HCT 41.9 37.5   * 368     Recent Labs     06/04/22  1211 06/05/22  0340   * 135   K 3.8 4.3   CL  --  99   CO2  --  25   BUN  --  32*   CREATININE 0.5 0.7   CALCIUM  --  9.4     Recent Labs     06/04/22  1407 06/05/22  0340   AST 49* 206*   ALT 44 156*   BILIDIR 0.6* 2.0*   BILITOT 1.5* 2.8*   ALKPHOS 95 138*     No results for input(s): INR in the last 72 hours. No results for input(s): Eve Guadarrama in the last 72 hours. Radiology reports-   CT ABDOMEN PELVIS WO CONTRAST Additional Contrast? None    Result Date: 6/4/2022  PROCEDURE: CT ABDOMEN PELVIS WO CONTRAST CLINICAL INFORMATION: 58-year-old female with right-sided abdominal pain for one week . COMPARISON: None. TECHNIQUE: Axial 5 mm CT images were obtained through the abdomen and pelvis. No contrast was given. Sagittal and coronal reconstructions were obtained. All CT scans at this facility use dose modulation, iterative reconstruction, and/or weight-based dosing when appropriate to reduce radiation dose to as low as reasonably achievable. FINDINGS: There is atelectasis at the bilateral lung bases.  No pleural effusion or pneumothorax. Lack of IV contrast limits evaluation of the abdominal organs. The gallbladder contains calculi. It is diffusely thickened and there are extensive inflammatory changes in the adjacent fat. The stomach is fluid-filled. There is a small hiatal hernia. Fluid is tracking superiorly into the esophagus. The liver and spleen have smooth contours and are normal in size. The head of the pancreas is obscured due to the extensive inflammatory changes which appear to arise from the gallbladder. The utilized portions of the pancreas appear to be within normal limits. The adrenal glands are within normal limits. There is no hydronephrosis. No evidence of a small bowel obstruction. The transverse colon has a somewhat thickened appearance near the hepatic flexure thought to be secondary to inflammatory changes arising from the gallbladder. The uterus is present. The urinary bladder is nondistended. The aorta and the IVC are normal in caliber. The bones are intact. No acute fracture. 1. Findings of acute cholecystitis. There is extensive inflammatory haziness in the adjacent fat with reactive thickening of the adjacent portion of the colon. 2. There is a small hiatal hernia and there is fluid tracking superiorly into the esophagus. **This report has been created using voice recognition software. It may contain minor errors which are inherent in voice recognition technology. ** Final report electronically signed by Dr Haider Farr on 6/4/2022 12:52 PM    US GALLBLADDER RUQ    Result Date: 6/4/2022  PROCEDURE: US GALLBLADDER RUQ CLINICAL INFORMATION: RUQ abdominal pain COMPARISON: CT abdomen and pelvis from same day TECHNIQUE: Grayscale and color Doppler imaging of the gallbladder was performed. TECHNICAL DATA: Gallbladder - 12.97 x 3.94 x 5.00 cm Gallbladder Wall - 0.19 cm Common Duct - 0.46 cm Bermeo's Sign: positive FINDINGS: GALLBLADDER: No gallbladder wall thickening. Distended gallbladder. . Multiple gallstones are seen. Mild pericholecystic fluid is seen. Positive sonographic Bermeo's sign. COMMON DUCT: The common bile duct measures 5 mm and is not dilated. LIVER: 1. Visualized portions of the liver are unremarkable. 1. Distended gallbladder with multiple gallstones, positive sonographic Bermeo's sign and mild pericholecystic fluid. Findings relate to acute cholecystitis. **This report has been created using voice recognition software. It may contain minor errors which are inherent in voice recognition technology. ** Final report electronically signed by Dr Jaelyn Mclaughlin on 6/4/2022 4:04 PM      Electronically signed by Dwain Mccormick DO on 6/5/2022 at 8:46 AM

## 2022-06-05 NOTE — ANESTHESIA PRE PROCEDURE
Department of Anesthesiology  Preprocedure Note       Name:  Mimi Campos   Age:  60 y.o.  :  1965                                          MRN:  [id number]         Date:  2022      Surgeon: Gregoria Elias):  Yary Dozier MD    Procedure: Procedure(s):  CHOLECYSTECTOMY LAPAROSCOPIC ROBOTICPOSS COLANGIIOGRAM< POSS OPEN    Medications prior to admission:   Prior to Admission medications    Medication Sig Start Date End Date Taking?  Authorizing Provider   naproxen (NAPROSYN) 500 MG tablet Take 1 tablet by mouth 2 times daily  Patient not taking: Reported on 2022   Ronaldo Donato MD   cyclobenzaprine (FLEXERIL) 10 MG tablet Take 1 tablet by mouth 3 times daily as needed for Muscle spasms  Patient not taking: Reported on 2022   Ronaldo Donato MD       Current medications:    Current Facility-Administered Medications   Medication Dose Route Frequency Provider Last Rate Last Admin    0.9 % sodium chloride infusion   IntraVENous Continuous Elias Dick  mL/hr at 22 1520 New Bag at 22 1520    glucose chewable tablet 16 g  4 tablet Oral PRN Signa Arrant, DO        dextrose bolus 10% 125 mL  125 mL IntraVENous PRN Signa Arrant, DO        Or    dextrose bolus 10% 250 mL  250 mL IntraVENous PRN Signa Arrant, DO        glucagon (rDNA) injection 1 mg  1 mg IntraMUSCular PRN Signa Arrant, DO        dextrose 5 % solution  100 mL/hr IntraVENous PRN Signa Arrant, DO        sodium chloride flush 0.9 % injection 5-40 mL  5-40 mL IntraVENous 2 times per day Signa Arrant, DO   10 mL at 22    sodium chloride flush 0.9 % injection 5-40 mL  5-40 mL IntraVENous PRN Signa Arrant, DO        0.9 % sodium chloride infusion   IntraVENous PRN Signa Arrant, DO        enoxaparin Sodium (LOVENOX) injection 30 mg  30 mg SubCUTAneous BID Signa Arrant, DO   30 mg at 22    ondansetron (ZOFRAN-ODT) disintegrating tablet 4 mg  4 mg Oral Q8H PRN Jerilee Brick, DO        Or    ondansetron [facility]) injection 4 mg  4 mg IntraVENous Q6H PRN Jerilee Brick, DO   4 mg at 06/05/22 0336    polyethylene glycol (GLYCOLAX) packet 17 g  17 g Oral Daily PRN Jerilee Brick, DO        acetaminophen (TYLENOL) tablet 650 mg  650 mg Oral Q6H PRN Jerilee Brick, DO        Or    acetaminophen (TYLENOL) suppository 650 mg  650 mg Rectal Q6H PRN Jerilee Brick, DO        potassium chloride (KLOR-CON M) extended release tablet 40 mEq  40 mEq Oral PRN Jerilee Brick, DO        Or    potassium bicarb-citric acid (EFFER-K) effervescent tablet 40 mEq  40 mEq Oral PRN Jerilee Brick, DO        Or    potassium chloride 10 mEq/100 mL IVPB (Peripheral Line)  10 mEq IntraVENous PRN Jerilee Brick, DO        famotidine (PEPCID) injection 20 mg  20 mg IntraVENous BID Jerilee Brick, DO   20 mg at 06/04/22 2130    insulin lispro (HUMALOG) injection vial 0-6 Units  0-6 Units SubCUTAneous Q4H Jerilee Brick, DO   1 Units at 06/05/22 0026    piperacillin-tazobactam (ZOSYN) 3,375 mg in dextrose 5 % 50 mL IVPB extended infusion (mini-bag)  3,375 mg IntraVENous Q8H Jerilee Brick, DO   Stopped at 06/05/22 0930    0.9 % sodium chloride infusion   IntraVENous Continuous Abhishek Neal,  mL/hr at 06/05/22 0343 New Bag at 06/05/22 0343    HYDROmorphone (DILAUDID) injection 0.5 mg  0.5 mg IntraVENous Q3H PRN Jerilee Brick, DO   0.5 mg at 06/05/22 0465    indocyanine green (IC-GREEN) syringe 1.25 mg  1.25 mg IntraVENous Once Marisol Muse MD           Allergies:     Allergies   Allergen Reactions    Sulfa Antibiotics Rash       Problem List:    Patient Active Problem List   Diagnosis Code    Acute cholecystitis due to biliary calculus K80.00       Past Medical History:        Diagnosis Date    Heart murmur        Past Surgical History:        Procedure Laterality Date    KNEE SURGERY      MANDIBLE SURGERY         Social History:    Social History     Tobacco Use    Smoking status: Never Smoker    Smokeless tobacco: Never Used   Substance Use Topics    Alcohol use: No                                Counseling given: Not Answered      Vital Signs (Current):   Vitals:    06/05/22 0329 06/05/22 0415 06/05/22 0627 06/05/22 0930   BP: 129/70   109/76   Pulse: (!) 118 (!) 116 (!) 117 (!) 112   Resp: 18 18 18 18   Temp: 99 °F (37.2 °C)   98.6 °F (37 °C)   TempSrc: Oral   Oral   SpO2: 95%   93%   Weight:       Height:                                                  BP Readings from Last 3 Encounters:   06/05/22 109/76   09/08/17 139/72       NPO Status:                                                                                 BMI:   Wt Readings from Last 3 Encounters:   06/04/22 231 lb (104.8 kg)   09/08/17 210 lb (95.3 kg)     Body mass index is 42.25 kg/m².     CBC:   Lab Results   Component Value Date    WBC 24.1 06/05/2022    RBC 4.09 06/05/2022    HGB 12.2 06/05/2022    HCT 37.5 06/05/2022    MCV 91.7 06/05/2022    RDW 13.7 06/04/2022     06/05/2022       CMP:   Lab Results   Component Value Date     06/05/2022    K 4.3 06/05/2022    CL 99 06/05/2022    CO2 25 06/05/2022    BUN 32 06/05/2022    CREATININE 0.7 06/05/2022    LABGLOM 86 06/05/2022    GLUCOSE 131 06/05/2022    PROT 6.4 06/05/2022    CALCIUM 9.4 06/05/2022    BILITOT 2.8 06/05/2022    ALKPHOS 138 06/05/2022     06/05/2022     06/05/2022       POC Tests:   Recent Labs     06/05/22  0714   POCGLU 117*       Coags: No results found for: PROTIME, INR, APTT    HCG (If Applicable):   Lab Results   Component Value Date    PREGSERUM NEGATIVE 06/04/2022        ABGs: No results found for: PHART, PO2ART, FZW4YHN, JIY8CMG, BEART, [id number]     Type & Screen (If Applicable):  No results found for: LABABO, LABRH    Drug/Infectious Status (If Applicable):  No results found for: HIV, HEPCAB    COVID-19 Screening (If Applicable): No results found for: COVID19        Anesthesia Evaluation  Patient summary reviewed no history of anesthetic complications:   Airway: Mallampati: II  TM distance: >3 FB   Neck ROM: full  Mouth opening: > = 3 FB   Dental: normal exam         Pulmonary:normal exam                               Cardiovascular:                      Neuro/Psych:               GI/Hepatic/Renal:   (+) morbid obesity          Endo/Other:                     Abdominal:   (+) obese,           Vascular: Other Findings:           Anesthesia Plan      general     ASA 3       Induction: intravenous and rapid sequence. MIPS: Postoperative opioids intended and Prophylactic antiemetics administered. Anesthetic plan and risks discussed with patient, spouse and child/children.       Plan discussed with CRNA and surgical team.                    Ed Batista MD   6/5/2022

## 2022-06-06 LAB
ACINETOBACTER BAUMANNII FILM ARRAY: NOT DETECTED
ALBUMIN SERPL-MCNC: 2.7 G/DL (ref 3.5–5.1)
ALP BLD-CCNC: 127 U/L (ref 38–126)
ALT SERPL-CCNC: 132 U/L (ref 11–66)
ANION GAP SERPL CALCULATED.3IONS-SCNC: 12 MEQ/L (ref 8–16)
AST SERPL-CCNC: 95 U/L (ref 5–40)
BASOPHILS # BLD: 0.1 %
BASOPHILS ABSOLUTE: 0 THOU/MM3 (ref 0–0.1)
BILIRUB SERPL-MCNC: 1.1 MG/DL (ref 0.3–1.2)
BILIRUBIN DIRECT: 0.5 MG/DL (ref 0–0.3)
BOTTLE TYPE: NORMAL
BUN BLDV-MCNC: 32 MG/DL (ref 7–22)
CALCIUM SERPL-MCNC: 8.1 MG/DL (ref 8.5–10.5)
CANDIDA ALBICANS FILM ARRAY: NOT DETECTED
CANDIDA GLABRATA FILM ARRAY: NOT DETECTED
CANDIDA KRUSEI FILM ARRAY: NOT DETECTED
CANDIDA PARAPSILOSIS FILM ARRAY: NOT DETECTED
CANDIDA TROPICALIS FILM ARRAY: NOT DETECTED
CARBAPENEM RESITANT FILM ARRAY: NORMAL
CHLORIDE BLD-SCNC: 103 MEQ/L (ref 98–111)
CO2: 20 MEQ/L (ref 23–33)
CREAT SERPL-MCNC: 0.5 MG/DL (ref 0.4–1.2)
EKG ATRIAL RATE: 119 BPM
EKG P AXIS: 65 DEGREES
EKG P-R INTERVAL: 156 MS
EKG Q-T INTERVAL: 316 MS
EKG QRS DURATION: 78 MS
EKG QTC CALCULATION (BAZETT): 444 MS
EKG R AXIS: 4 DEGREES
EKG T AXIS: 68 DEGREES
EKG VENTRICULAR RATE: 119 BPM
ENTERBACTER CLOACAE FILM ARRAY: NOT DETECTED
ENTERBACTERIACEAE FILM ARRAY: NOT DETECTED
ENTEROCOCCUS FILM ARRAY: NOT DETECTED
EOSINOPHIL # BLD: 0 %
EOSINOPHILS ABSOLUTE: 0 THOU/MM3 (ref 0–0.4)
ERYTHROCYTE [DISTWIDTH] IN BLOOD BY AUTOMATED COUNT: 13.3 % (ref 11.5–14.5)
ERYTHROCYTE [DISTWIDTH] IN BLOOD BY AUTOMATED COUNT: 49.1 FL (ref 35–45)
ESCHERICHIA COLI FILM ARRAY: NOT DETECTED
GFR SERPL CREATININE-BSD FRML MDRD: > 90 ML/MIN/1.73M2
GLUCOSE BLD-MCNC: 100 MG/DL (ref 70–108)
GLUCOSE BLD-MCNC: 102 MG/DL (ref 70–108)
GLUCOSE BLD-MCNC: 112 MG/DL (ref 70–108)
GLUCOSE BLD-MCNC: 112 MG/DL (ref 70–108)
GLUCOSE BLD-MCNC: 124 MG/DL (ref 70–108)
HAEMOPHILUS INFLUENZA FILM ARRAY: NOT DETECTED
HCT VFR BLD CALC: 31 % (ref 37–47)
HCT VFR BLD CALC: 32.6 % (ref 37–47)
HEMOGLOBIN: 9.7 GM/DL (ref 12–16)
HEMOGLOBIN: 9.8 GM/DL (ref 12–16)
IMMATURE GRANS (ABS): 0.08 THOU/MM3 (ref 0–0.07)
IMMATURE GRANULOCYTES: 0.5 %
KLEBSIELLA OXYTOCA FILM ARRAY: NOT DETECTED
KLEBSIELLA PNEUMONIAE FILM ARRAY: NOT DETECTED
LISTERIA MONOCYTOGENES FILM ARRAY: NOT DETECTED
LYMPHOCYTES # BLD: 5.7 %
LYMPHOCYTES ABSOLUTE: 0.9 THOU/MM3 (ref 1–4.8)
MCH RBC QN AUTO: 29.9 PG (ref 26–33)
MCHC RBC AUTO-ENTMCNC: 29.8 GM/DL (ref 32.2–35.5)
MCV RBC AUTO: 100.6 FL (ref 81–99)
METHICILLIN RESISTANT FILM ARRAY: NORMAL
MONOCYTES # BLD: 7 %
MONOCYTES ABSOLUTE: 1.1 THOU/MM3 (ref 0.4–1.3)
NEISSERIA MENIGITIDIS FILM ARRAY: NOT DETECTED
NUCLEATED RED BLOOD CELLS: 0 /100 WBC
PLATELET # BLD: 326 THOU/MM3 (ref 130–400)
PMV BLD AUTO: 10.4 FL (ref 9.4–12.4)
POTASSIUM REFLEX MAGNESIUM: 4.2 MEQ/L (ref 3.5–5.2)
PROTEUS FILM ARRAY: NOT DETECTED
PSEUDOMONAS AERUGINOSA FILM ARRAY: NOT DETECTED
RBC # BLD: 3.24 MILL/MM3 (ref 4.2–5.4)
SEG NEUTROPHILS: 86.7 %
SEGMENTED NEUTROPHILS ABSOLUTE COUNT: 13.1 THOU/MM3 (ref 1.8–7.7)
SERRATIA MARCESCENS FILM ARRAY: NOT DETECTED
SODIUM BLD-SCNC: 135 MEQ/L (ref 135–145)
SOURCE OF BLOOD CULTURE: NORMAL
STAPH AUREUS FILM ARRAY: NOT DETECTED
STAPHYLOCOCCUS FILM ARRAY: NOT DETECTED
STREP AGALACTIAE FILM ARRAY: NOT DETECTED
STREP PNEUMONIAE FILM ARRAY: NOT DETECTED
STREP PYOCGENES FILM ARRAY: NOT DETECTED
STREPTOCOCCUS FILM ARRAY: NOT DETECTED
TOTAL PROTEIN: 5.9 G/DL (ref 6.1–8)
VANCOMYCIN RESISTANT FILM ARRAY: NORMAL
WBC # BLD: 15.1 THOU/MM3 (ref 4.8–10.8)

## 2022-06-06 PROCEDURE — 6370000000 HC RX 637 (ALT 250 FOR IP): Performed by: SURGERY

## 2022-06-06 PROCEDURE — 36415 COLL VENOUS BLD VENIPUNCTURE: CPT

## 2022-06-06 PROCEDURE — 6360000002 HC RX W HCPCS: Performed by: SURGERY

## 2022-06-06 PROCEDURE — 2500000003 HC RX 250 WO HCPCS: Performed by: SURGERY

## 2022-06-06 PROCEDURE — 82948 REAGENT STRIP/BLOOD GLUCOSE: CPT

## 2022-06-06 PROCEDURE — 80076 HEPATIC FUNCTION PANEL: CPT

## 2022-06-06 PROCEDURE — 85014 HEMATOCRIT: CPT

## 2022-06-06 PROCEDURE — 80048 BASIC METABOLIC PNL TOTAL CA: CPT

## 2022-06-06 PROCEDURE — 85018 HEMOGLOBIN: CPT

## 2022-06-06 PROCEDURE — 2580000003 HC RX 258: Performed by: SURGERY

## 2022-06-06 PROCEDURE — 93010 ELECTROCARDIOGRAM REPORT: CPT | Performed by: NUCLEAR MEDICINE

## 2022-06-06 PROCEDURE — 99024 POSTOP FOLLOW-UP VISIT: CPT | Performed by: SURGERY

## 2022-06-06 PROCEDURE — 2060000000 HC ICU INTERMEDIATE R&B

## 2022-06-06 PROCEDURE — 85025 COMPLETE CBC W/AUTO DIFF WBC: CPT

## 2022-06-06 RX ORDER — HYDROCODONE BITARTRATE AND ACETAMINOPHEN 5; 325 MG/1; MG/1
2 TABLET ORAL EVERY 4 HOURS PRN
Status: DISCONTINUED | OUTPATIENT
Start: 2022-06-06 | End: 2022-06-08 | Stop reason: HOSPADM

## 2022-06-06 RX ORDER — HYDROCODONE BITARTRATE AND ACETAMINOPHEN 5; 325 MG/1; MG/1
1 TABLET ORAL EVERY 4 HOURS PRN
Status: DISCONTINUED | OUTPATIENT
Start: 2022-06-06 | End: 2022-06-08 | Stop reason: HOSPADM

## 2022-06-06 RX ADMIN — HYDROCODONE BITARTRATE AND ACETAMINOPHEN 2 TABLET: 5; 325 TABLET ORAL at 20:25

## 2022-06-06 RX ADMIN — SODIUM CHLORIDE, PRESERVATIVE FREE 40 ML: 5 INJECTION INTRAVENOUS at 20:20

## 2022-06-06 RX ADMIN — PIPERACILLIN AND TAZOBACTAM 3375 MG: 3; .375 INJECTION, POWDER, LYOPHILIZED, FOR SOLUTION INTRAVENOUS at 18:40

## 2022-06-06 RX ADMIN — PIPERACILLIN AND TAZOBACTAM 3375 MG: 3; .375 INJECTION, POWDER, LYOPHILIZED, FOR SOLUTION INTRAVENOUS at 03:47

## 2022-06-06 RX ADMIN — HYDROCODONE BITARTRATE AND ACETAMINOPHEN 1 TABLET: 5; 325 TABLET ORAL at 08:38

## 2022-06-06 RX ADMIN — HYDROCODONE BITARTRATE AND ACETAMINOPHEN 1 TABLET: 5; 325 TABLET ORAL at 15:49

## 2022-06-06 RX ADMIN — HYDROMORPHONE HYDROCHLORIDE 0.5 MG: 1 INJECTION, SOLUTION INTRAMUSCULAR; INTRAVENOUS; SUBCUTANEOUS at 07:02

## 2022-06-06 RX ADMIN — PIPERACILLIN AND TAZOBACTAM 3375 MG: 3; .375 INJECTION, POWDER, LYOPHILIZED, FOR SOLUTION INTRAVENOUS at 11:35

## 2022-06-06 RX ADMIN — HYDROMORPHONE HYDROCHLORIDE 0.5 MG: 1 INJECTION, SOLUTION INTRAMUSCULAR; INTRAVENOUS; SUBCUTANEOUS at 01:26

## 2022-06-06 RX ADMIN — FAMOTIDINE 20 MG: 10 INJECTION, SOLUTION INTRAVENOUS at 09:28

## 2022-06-06 RX ADMIN — FAMOTIDINE 20 MG: 10 INJECTION, SOLUTION INTRAVENOUS at 20:26

## 2022-06-06 RX ADMIN — ENOXAPARIN SODIUM 30 MG: 100 INJECTION SUBCUTANEOUS at 09:29

## 2022-06-06 ASSESSMENT — PAIN DESCRIPTION - ONSET: ONSET: GRADUAL

## 2022-06-06 ASSESSMENT — PAIN SCALES - GENERAL
PAINLEVEL_OUTOF10: 3
PAINLEVEL_OUTOF10: 7
PAINLEVEL_OUTOF10: 9
PAINLEVEL_OUTOF10: 6
PAINLEVEL_OUTOF10: 6
PAINLEVEL_OUTOF10: 4

## 2022-06-06 ASSESSMENT — PAIN DESCRIPTION - LOCATION
LOCATION: ABDOMEN
LOCATION: HEAD
LOCATION: ABDOMEN
LOCATION: ABDOMEN

## 2022-06-06 ASSESSMENT — PAIN DESCRIPTION - FREQUENCY: FREQUENCY: INTERMITTENT

## 2022-06-06 ASSESSMENT — PAIN DESCRIPTION - ORIENTATION: ORIENTATION: MID

## 2022-06-06 ASSESSMENT — PAIN - FUNCTIONAL ASSESSMENT: PAIN_FUNCTIONAL_ASSESSMENT: ACTIVITIES ARE NOT PREVENTED

## 2022-06-06 ASSESSMENT — PAIN DESCRIPTION - DESCRIPTORS: DESCRIPTORS: SORE

## 2022-06-06 ASSESSMENT — PAIN DESCRIPTION - PAIN TYPE: TYPE: ACUTE PAIN

## 2022-06-06 ASSESSMENT — PAIN SCALES - WONG BAKER: WONGBAKER_NUMERICALRESPONSE: 0

## 2022-06-06 NOTE — PROGRESS NOTES
Pharmacy Medication History Note      List of current medications patient is taking is complete. Source of information: patient    Changes made to medication list:  Medications removed (include reason, ex. therapy complete or physician discontinued):  Cyclobenzaprine 10 mg -- no longer taking  Naproxen 500 mg -- no longer taking    Medications added/doses adjusted: Added milk thistle daily  Added zinc sulfate daily  Added turmeric daily  Added Excedrin PRN    Other notes (ex. Recent course of antibiotics, Coumadin dosing):  Patient stated that she takes vitamins as she remembers. Denies use of other OTC or herbal medications.       Allergies reviewed      Electronically signed by Floretta Hammans on 6/6/2022 at 3:40 PM

## 2022-06-06 NOTE — PROGRESS NOTES
Hospitalist      Patient:  [facility]    Unit/Bed:4K-10/010-A  YOB: 1965  MRN: [id number]   Acct: [de-identified]     PCP: Katty Kerr DO  Date of Admission: 6/4/2022        Assessment and Plan:        1. Acute cholecystitis due to biliary calculus: General surgery has been consulted, will start on IV fluids, IV antibiotic, blood cultures, daily CBC with differential and BMP, EKG (potential preop need), ? MRCP  2. Right upper quadrant abdominal pain secondary to #1  3. Leukocytosis/lactic acidosis/tachycardia- SIRS  4. Acute cystitis with hematuria: We will obtain urine culture, patient is currently on Zosyn  5. Hyperglycemia we will check hemoglobin A1c, and urine for microalbumin  6. Nausea and vomiting: Secondary to #1, IV fluids, antiemetics      6.5.2022 patient seen this a.m. states she had a difficult night secondary to pain. Patient states that surgery is planned for 11 AM today. Nonsignificant improvement in WBC still markedly elevated, increased alk phos ALT AST and bilirubin    6.6.2022 seen this a.m. complaining of headache/migraine usually takes Excedrin at home, has p.o. pain meds ordered. Liver enzymes are improving, WBC counts improving. Blood glucose levels are stable. Blood 1 of 2 +ve most likely contaminant      CC: Right upper quadrant of abdomen pain/blood in urine    HPI: 78-year-old obese white female who presents for evaluation of right upper quadrant abdominal pain for the past 2 nights. Patient's pain initially is on the upper abdomen and goes across from the epigastric area to the left to right, pain has been intermittent. Yesterday the the patient has been vomiting all day and today now the pain is consistently on the right quadrant of the abdomen. Denies any fever however had chills, patient also noted blood in her urine however denies any dysuria, frequency, urgency, no vaginal discharge or vaginal bleeding.   Patient's pain is 5 out of 10 gets worse as time goes on 10/10 when she does movements or lying down on her back. Denies any constipation, patient is been seen in urgent care and has laboratory data showing white count of 25,000 and a CT scan of the abdomen showing acute Evie cystitis and urine positive for nitrates. No past medical history    ROS (14 point review of systems completed. Pertinent positives noted. Otherwise ROS is negative) : Abdominal pain, nausea and vomiting hematuria    PMH:  Per HPI and       Diagnosis Date    Heart murmur      SHX:        Procedure Laterality Date    CHOLECYSTECTOMY, LAPAROSCOPIC N/A 6/5/2022    ROBOTIC CHOLECYSTECTOMY CONVERTED TO OPEN PROCEDURE performed by Guevara Rivera MD at [address]: History reviewed. No pertinent family history. SOCHX:   Social History     Socioeconomic History    Marital status:      Spouse name: None    Number of children: None    Years of education: None    Highest education level: None   Occupational History    None   Tobacco Use    Smoking status: Never Smoker    Smokeless tobacco: Never Used   Vaping Use    Vaping Use: Never used   Substance and Sexual Activity    Alcohol use: No    Drug use: No    Sexual activity: None   Other Topics Concern    None   Social History Narrative    None     Social Determinants of Health     Financial Resource Strain:     Difficulty of Paying Living Expenses: Not on file   Food Insecurity:     Worried About Running Out of Food in the Last Year: Not on file    Ti of Food in the Last Year: Not on file   Transportation Needs:     Lack of Transportation (Medical): Not on file    Lack of Transportation (Non-Medical):  Not on file   Physical Activity:     Days of Exercise per Week: Not on file    Minutes of Exercise per Session: Not on file   Stress:     Feeling of Stress : Not on file   Social Connections:     Frequency of Communication with Friends and Family: Not on file    Frequency of Social Gatherings with Friends and Family: Not on file    Attends Mandaeism Services: Not on file    Active Member of Clubs or Organizations: Not on file    Attends Club or Organization Meetings: Not on file    Marital Status: Not on file   Intimate Partner Violence:     Fear of Current or Ex-Partner: Not on file    Emotionally Abused: Not on file    Physically Abused: Not on file    Sexually Abused: Not on file   Housing Stability:     Unable to Pay for Housing in the Last Year: Not on file    Number of [place] in the Last Year: Not on file    Unstable Housing in the Last Year: Not on file      Allergies: Sulfa antibiotics  Medications:     sodium chloride 100 mL/hr at 06/05/22 1330    dextrose      sodium chloride        enoxaparin  30 mg SubCUTAneous BID    sodium chloride flush  5-40 mL IntraVENous 2 times per day    famotidine (PEPCID) injection  20 mg IntraVENous BID    insulin lispro  0-6 Units SubCUTAneous Q4H    piperacillin-tazobactam  3,375 mg IntraVENous Q8H     Prior to Admission medications    Medication Sig Start Date End Date Taking? Authorizing Provider   naproxen (NAPROSYN) 500 MG tablet Take 1 tablet by mouth 2 times daily  Patient not taking: Reported on 6/4/2022 9/8/17   Mariel Morales MD   cyclobenzaprine (FLEXERIL) 10 MG tablet Take 1 tablet by mouth 3 times daily as needed for Muscle spasms  Patient not taking: Reported on 6/4/2022 9/8/17   Mariel Morales MD      PHYSICAL EXAM:    /75   Pulse 82   Temp 97.2 °F (36.2 °C) (Oral)   Resp 16   Ht 5' 2\" (1.575 m)   Wt 231 lb (104.8 kg)   SpO2 96%   BMI 42.25 kg/m²     General appearance:  No apparent distress, appears stated age and cooperative. HEENT:  Normal cephalic, atraumatic without obvious deformity. Pupils equal, round, and reactive to light. Extra ocular muscles intact. Conjunctivae/corneas clear. Neck: Supple, with full range of motion. no jugular venous distention.  Trachea midline. no carotid bruits  Respiratory:  Normal respiratory effort. Clear to auscultation, bilaterally without Rales/Wheezes/Rhonchi. Breath sounds equal bilaterally  Cardiovascular:  Regular rate and rhythm with normal S1/S2 without murmurs, rubs or gallops. PMI non displaced  Abdomen: Soft, RUQ tender, non-distended with normal bowel sounds. No guarding, rebound. Musculoskeletal:  No clubbing, cyanosis or edema bilaterally. Full range of motion without deformity. Skin: Skin color, texture, turgor normal.  No rashes or lesions, or suspicious lesions. Neurologic:  Neurovascularly intact without any focal sensory/motor deficits. Cranial nerves: II-XII intact, grossly non-focal.  Psychiatric:  Alert and oriented, thought content appropriate, normal insight  Capillary Refill: Brisk,< 2 seconds   Peripheral Pulses: +2 palpable, equal bilaterally upper and lower extremities  Lymphatics: no lymphadenopathy    Data: (All radiographs, tracings, PFTs, and imaging are personally viewed and interpreted unless otherwise noted).    Recent Labs     06/04/22  1211 06/05/22  0340 06/06/22  0441   WBC 25.4* 24.1* 15.1*   HGB 14.3 12.2 9.7*   HCT 41.9 37.5 32.6*   * 368 326     Recent Labs     06/04/22  1211 06/05/22  0340 06/06/22  0441   * 135 135   K 3.8 4.3 4.2   CL  --  99 103   CO2  --  25 20*   BUN  --  32* 32*   CREATININE 0.5 0.7 0.5   CALCIUM  --  9.4 8.1*     Recent Labs     06/04/22  1407 06/05/22  0340 06/06/22  0441   AST 49* 206* 95*   ALT 44 156* 132*   BILIDIR 0.6* 2.0* 0.5*   BILITOT 1.5* 2.8* 1.1   ALKPHOS 95 138* 127*     No results for input(s): INR in the last 72 hours. No results for input(s): Dinora Lowers in the last 72 hours. Radiology reports-   CT ABDOMEN PELVIS WO CONTRAST Additional Contrast? None    Result Date: 6/4/2022  PROCEDURE: CT ABDOMEN PELVIS WO CONTRAST CLINICAL INFORMATION: 53-year-old female with right-sided abdominal pain for one week . COMPARISON: None. TECHNIQUE: Axial 5 mm CT images were obtained through the abdomen and pelvis. No contrast was given. Sagittal and coronal reconstructions were obtained. All CT scans at this facility use dose modulation, iterative reconstruction, and/or weight-based dosing when appropriate to reduce radiation dose to as low as reasonably achievable. FINDINGS: There is atelectasis at the bilateral lung bases. No pleural effusion or pneumothorax. Lack of IV contrast limits evaluation of the abdominal organs. The gallbladder contains calculi. It is diffusely thickened and there are extensive inflammatory changes in the adjacent fat. The stomach is fluid-filled. There is a small hiatal hernia. Fluid is tracking superiorly into the esophagus. The liver and spleen have smooth contours and are normal in size. The head of the pancreas is obscured due to the extensive inflammatory changes which appear to arise from the gallbladder. The utilized portions of the pancreas appear to be within normal limits. The adrenal glands are within normal limits. There is no hydronephrosis. No evidence of a small bowel obstruction. The transverse colon has a somewhat thickened appearance near the hepatic flexure thought to be secondary to inflammatory changes arising from the gallbladder. The uterus is present. The urinary bladder is nondistended. The aorta and the IVC are normal in caliber. The bones are intact. No acute fracture. 1. Findings of acute cholecystitis. There is extensive inflammatory haziness in the adjacent fat with reactive thickening of the adjacent portion of the colon. 2. There is a small hiatal hernia and there is fluid tracking superiorly into the esophagus. **This report has been created using voice recognition software. It may contain minor errors which are inherent in voice recognition technology. ** Final report electronically signed by Dr Pietro Albert on 6/4/2022 12:52 PM    US GALLBLADDER RUQ    Result Date: 6/4/2022  PROCEDURE: US GALLBLADDER RUQ CLINICAL INFORMATION: RUQ abdominal pain COMPARISON: CT abdomen and pelvis from same day TECHNIQUE: Grayscale and color Doppler imaging of the gallbladder was performed. TECHNICAL DATA: Gallbladder - 12.97 x 3.94 x 5.00 cm Gallbladder Wall - 0.19 cm Common Duct - 0.46 cm Bermeo's Sign: positive FINDINGS: GALLBLADDER: No gallbladder wall thickening. Distended gallbladder. . Multiple gallstones are seen. Mild pericholecystic fluid is seen. Positive sonographic Bermeo's sign. COMMON DUCT: The common bile duct measures 5 mm and is not dilated. LIVER: 1. Visualized portions of the liver are unremarkable. 1. Distended gallbladder with multiple gallstones, positive sonographic Bermeo's sign and mild pericholecystic fluid. Findings relate to acute cholecystitis. **This report has been created using voice recognition software. It may contain minor errors which are inherent in voice recognition technology. ** Final report electronically signed by Dr Margaret Pimentel on 6/4/2022 4:04 PM      Electronically signed by Ellyn Last DO on 6/6/2022 at 8:46 AM

## 2022-06-06 NOTE — FLOWSHEET NOTE
[place] [id number] PROGRESS NOTE      Patient: Jaquelin Davis  Room #: 4K-10/010-A            YOB: 1965  Age: 60 y.o. Gender: female            Admit Date & Time: 6/4/2022 11:33 AM    Assessment:  Andrea Mejia was sitting up in bed and working on her laptop computer. Her  was in the room with her. Andrea Mejia is hoping to be able to go home tomorrow. She told me that her  was here to visit with her earlier in the day. Interventions:  I offered spiritual support through presence and encouragement. I offered a blessing for health and wellbeing. Outcomes:  Andrea Mejia and her  expressed gratitude for the visit. Plan:    1. Continued presence and encouragement.     Electronically signed by Haley Ch on 6/6/2022 at 4:42 PM.  3 [facility]  [phone]

## 2022-06-06 NOTE — PROGRESS NOTES
Pharmacy Note  BioFire Result    Dwain Carson is a 60 y.o. female, with a positive blood culture result    [employer] message received from Sebas Azul, laboratory employee on 6/6/2022 at 10:25 AM    First Gram stain result: gram positive cocci in clusters    BioFire BCID result: No organism detected    BioFire BCID and gram stain congruent? Yes    Suspected source? Contaminant     Patient chart has been reviewed for signs/symptoms of infection: Yes  /65   Pulse 95   Temp 97.2 °F (36.2 °C) (Oral)   Resp 16   Ht 5' 2\" (1.575 m)   Wt 231 lb (104.8 kg)   SpO2 96%   BMI 42.25 kg/m²   Lab Results   Component Value Date    WBC 15.1 (H) 06/06/2022     Allergies reviewed  Sulfa antibiotics    Renal function reviewed  Estimated Creatinine Clearance: 139 mL/min (based on SCr of 0.5 mg/dL).     Current antibiotic regimen: zosyn    Intervention needed: PAUL Fermin [facility] [place]  6/6/2022 10:25 AM

## 2022-06-06 NOTE — PROGRESS NOTES
Rivera Leigh MD  Postoperative Progress Note  Pt Name: Fredi Aase  Medical Record Number: [id number]  Date of Birth 1965   Today's Date: 6/6/2022  ASSESSMENT   1. POD #1 robotic assisted laparoscopic cholecystectomy converted to open for acute gangrenous calculus cholecystitis  2. UTI present on admission  3. Tachycardia secondary to #1 improving  4. Evaded liver function test secondary to #1. Improving postop. 5.  has a past medical history of Heart murmur. PLANS   1. Analgesics and antiemetics as needed. Continue IV Zosyn. 2. IV hydration  3.  diet as tolerated  4. Increase activity  5. Lovenox for DVT prophylaxis. Monitor serial hemoglobins on Lovenox. Drain output not bloody but did have drop with surgery and hydration  6. GI prophylaxis  SUBJECTIVE   Marlow is doing fairly well  CURRENT MEDICATIONS   Scheduled Meds:   enoxaparin  30 mg SubCUTAneous BID    sodium chloride flush  5-40 mL IntraVENous 2 times per day    famotidine (PEPCID) injection  20 mg IntraVENous BID    insulin lispro  0-6 Units SubCUTAneous Q4H    piperacillin-tazobactam  3,375 mg IntraVENous Q8H     Continuous Infusions:   sodium chloride 100 mL/hr at 06/05/22 1330    dextrose      sodium chloride       PRN Meds:. HYDROcodone 5 mg - acetaminophen **OR** HYDROcodone 5 mg - acetaminophen, HYDROmorphone, glucose, dextrose bolus **OR** dextrose bolus, glucagon (rDNA), dextrose, sodium chloride flush, sodium chloride, ondansetron **OR** ondansetron, polyethylene glycol, acetaminophen **OR** acetaminophen, potassium chloride **OR** potassium alternative oral replacement **OR** potassium chloride  OBJECTIVE   CURRENT VITALS:  height is 5' 2\" (1.575 m) and weight is 231 lb (104.8 kg). Her oral temperature is 97.2 °F (36.2 °C). Her blood pressure is 126/75 and her pulse is 82. Her respiration is 16 and oxygen saturation is 96%. Body mass index is 42.25 kg/m².   Temperature Range (24h):Temp: 97.2 °F (36.2 °C) Temp  Av.9 °F (36.6 °C)  Min: 97 °F (36.1 °C)  Max: 99.2 °F (37.3 °C)  BP Range (29E): Systolic (95FEY), RNI:792 , Min:105 , DAYAMI:913     Diastolic (09ZLO), QEY:36, Min:56, Max:76    Pulse Range (24h): Pulse  Av.8  Min: 72  Max: 112  Respiration Range (24h): Resp  Av.1  Min: 13  Max: 18  Current Pulse Ox (24h):  SpO2: 96 %  Pulse Ox Range (24h):  SpO2  Av.9 %  Min: 93 %  Max: 98 %  Oxygen Amount and Delivery: O2 Flow Rate (L/min): 2 L/min  Incentive Spirometry Tx:            GENERAL: Peers fatigued, alert, cooperative, no distress  LUNGS: Clear no rhonchi or wheezing  HEART: Sinus tachycardia  ABDOMEN: Incisions intact dressings in place. Resolved tenderness  INCISION: Dressings in place. Drain output 40 mL maroon thin fluid  EXTREMITY: no cyanosis, clubbing or edema  In: 1950 [P.O.:200; I.V.:1750]  Out: 190 [Urine:150]  Closed/Suction Drain Right; Anterior RLQ Bulb-Output (ml): 0 ml    LABS     Recent Labs     22  1211 22  0340 22  0441   WBC 25.4* 24.1* 15.1*   HGB 14.3 12.2 9.7*   HCT 41.9 37.5 32.6*   * 368 326   * 135 135   K 3.8 4.3 4.2   CL  --  99 103   CO2  --  25 20*   BUN  --  32* 32*   CREATININE 0.5 0.7 0.5   CALCIUM  --  9.4 8.1*      No results for input(s): PTT, INR in the last 72 hours. Invalid input(s): PT  Recent Labs     22  1407 22  0340 22  0441   AST 49* 206* 95*   ALT 44 156* 132*   BILITOT 1.5* 2.8* 1.1   BILIDIR 0.6* 2.0* 0.5*   LIPASE 9.0  --   --    LACTA  --  0.9  --      No results for input(s): TROPONINT in the last 72 hours. RADIOLOGY     US GALLBLADDER RUQ   Final Result   1. Distended gallbladder with multiple gallstones, positive sonographic Bermeo's sign and mild pericholecystic fluid. Findings relate to acute cholecystitis. **This report has been created using voice recognition software.   It may contain minor errors which are inherent in voice recognition technology. **      Final report electronically signed by Dr Ravin Worrell on 6/4/2022 4:04 PM      CT ABDOMEN PELVIS WO CONTRAST Additional Contrast? None   Final Result      1. Findings of acute cholecystitis. There is extensive inflammatory haziness in the adjacent fat with reactive thickening of the adjacent portion of the colon. 2. There is a small hiatal hernia and there is fluid tracking superiorly into the esophagus. **This report has been created using voice recognition software. It may contain minor errors which are inherent in voice recognition technology. **      Final report electronically signed by Dr Gilmer Reddy on 6/4/2022 12:52 PM          Electronically signed by Lucrecia Rodriguez MD on 6/6/2022 at 7:53 AM

## 2022-06-06 NOTE — PLAN OF CARE
Assess pain using appropriate pain scale   Administer analgesics based on type and severity of pain and evaluate response   Implement non-pharmacological measures as appropriate and evaluate response     Problem: Gastrointestinal - Adult  Goal: Minimal or absence of nausea and vomiting  6/6/2022 0309 by Estefania Meneses RN  Outcome: Progressing  6/5/2022 1905 by El Bowers RN  Outcome: Progressing  Flowsheets (Taken 6/5/2022 1905)  Minimal or absence of nausea and vomiting:   Administer IV fluids as ordered to ensure adequate hydration   Provide nonpharmacologic comfort measures as appropriate   Administer ordered antiemetic medications as needed  6/5/2022 1904 by El Bowers RN  Outcome: Progressing  Flowsheets (Taken 6/4/2022 2250 by Ken Falk RN)  Minimal or absence of nausea and vomiting:   Administer IV fluids as ordered to ensure adequate hydration   Administer ordered antiemetic medications as needed

## 2022-06-06 NOTE — SIGNIFICANT EVENT
Nursing staff contacted this provider stating hematoma under incision started to bleed through a significant amount, controlled with pressure but hematoma still present. On my exam there is no active bleeding, abdomen is soft no significant tenderness to palpation. Incision is intact and dry, staples appropriately positioned. Small area of induration noted to palpation over medial incision. PRIYA drain with 5 mL serosanguineous fluid. Plan for abdominal binder, hold Lovenox, and every 8 hour H&H's. We will continue to monitor. Vital signs stable per nursing staff. Most recent /73, pulse 96.   Electronically signed by Shaw Wynne PA-C on 6/6/2022 at 5:54 PM

## 2022-06-06 NOTE — PROGRESS NOTES
Physician Progress Note      PATIENTChauncey Espinosa  CSN #:                  [id number]  :                       1965  ADMIT DATE:       2022 11:33 AM  DISCH DATE:  RESPONDING  PROVIDER #:        Janette Opitz DO          QUERY TEXT:    Dr Giancarlo River,    Pt admitted with Gangrenous cholecystitis. Pt noted to have tachycardia,   leukocytosis, lactic acidosis, & elevated procalcitonin. SIRS documented. If   possible, please document in the progress notes and discharge summary if you   are evaluating and /or treating any of the following: The medical record reflects the following:  Risk Factors: Gangrenous cholecystitis  Clinical Indicators: WBC 25.4, Lactic acid 2.4, procalcitonin 0.83, pulse 130  Treatment: 0.9 NS, IV Zosyn. Options provided:  -- Sepsis, present on admission  -- *** (localized infection such as pneumonia, UTI, or cellulitis) without   Sepsis  -- SIRS only-Sepsis was ruled out  -- Other - I will add my own diagnosis  -- Disagree - Not applicable / Not valid  -- Disagree - Clinically unable to determine / Unknown  -- Refer to Clinical Documentation Reviewer    PROVIDER RESPONSE TEXT:    This patient has SIRS only- sepsis was ruled out for this patient.     Query created by: Corona Salcedo on 2022 8:05 AM      Electronically signed by:  Janette Opitz DO 2022 8:09 AM

## 2022-06-06 NOTE — OP NOTE
Operative Note      Patient: Carlos Prado  YOB: 1965  MRN: [id number]    Date of Procedure: 6/5/2022    Pre-Op Diagnosis: Acute calculus cholecystitis    Post-Op Diagnosis: Acute calculus cholecystitis with gangrene of gallbladder       Procedure(s):  ROBOTIC CHOLECYSTECTOMY CONVERTED TO OPEN PROCEDURE  Open cholecystectomy  Surgeon(s):  Neelam Daniels MD    Assistant:   * No surgical staff found *    Anesthesia: General    Estimated Blood Loss (mL): less than 414     Complications: None    Specimens:   ID Type Source Tests Collected by Time Destination   A : Gallbladder Tissue Gallbladder SURGICAL PATHOLOGY Neelam Daniels MD 6/5/2022 1030        Implants:  * No implants in log *      Drains:   Closed/Suction Drain Right; Anterior RLQ Bulb (Active)   Dressing Status Clean, dry & intact 06/06/22 0000   Drainage Appearance Bloody 06/06/22 0000   Drain Status To bulb suction 06/06/22 0000   Output (ml) 0 ml 06/05/22 1213       Findings: Gangrenous cholecystitis with patchy gangrene of the gallbladder wall extensive pericholecystic inflammation. Gallbladder packed with stones     Procedure: The patient was brought to the operating suite and placed supine on the operating table with pneumatic sequential compression devices on the lower extremities. She was on Zosyn intravenously and redosed in the operating suite. After induction of general anesthesia the abdomen was prepped and draped. Timeout was performed. Incision was made at the umbilicus and carried down to the fascia. Fascia was elevated and incised. 12 mm port was inserted reducer was placed and CO2 pneumoperitoneum was introduced. It was very difficult to get adequate pneumoperitoneum for good visual view. Patient's colon was quite distended. Ports were placed in standard robotic positioning for gallbladder surgery. An assistant port was placed in the right upper quadrant.   There were some adhesions of descending colon that were taken down sharply. Once this was completed patient was placed in reverse Trendelenburg and turned with the left side down. The robot was docked. Instruments were placed and I retired consult. She had been given ICG dye prior to the procedure. There is extensive inflammatory adhesions of both the transverse colon and omentum to the area of the gallbladder these were dissected down bluntly. The fundus of the gallbladder was grasped and retracted upward. I was able to dissect down to the fundus but with the patchy gangrene of the gallbladder wall the wall eroded and some stones were extruding. It was difficult to see the infundibular structures as she had a very enlarged cystic node overlying the structures so at that point decision was made to convert to an open procedure. I scrubbed back in to the patient's bedside. Instruments were removed and the robot was undocked. All ports were removed. Infraumbilical fascia was closed at the end of the procedure with interrupted 0 Ethibond suture. Right upper quadrant incision was made. Extended down through the muscular layers of the abdominal wall and the right upper quadrant was entered. Inflammatory tissues around the infundibulum were dissected. Structures entering the gallbladder were clipped immediately adjacent to the infundibulum of the gallbladder including what was identified as the cystic duct. Gallbladder was then taken down retrograde and removed. Spilled stones were irrigated and suctioned from the right upper quadrant. There is no evidence of bleeding or evidence of bile leak. Right upper quadrant was irrigated with Irrisept. Liver bed was reexamined again there is still no evidence of any bile leak. Powdered Surgicel was placed in the liver bed. A 19 mm Darrell drain was placed in the right upper quadrant and brought through one of the 8 mm port sites on the right side of the abdomen. It was secured to skin using a silk suture.   The fascia was then closed with #1 Vicryl suture. Subcutaneous tissues were irrigated deeper fascia subcutaneous fat closed with 2-0 Vicryl suture skin closed with staples. Other small incisions were closed with subcuticular 4-0 Vicryl suture followed by application of skin glue. Patient remained hemodynamically stable throughout the procedure. She was transported to the recovery area. Postoperative findings were discussed with patient's . She will be continued on antibiotics postoperatively and monitor clinical status.         Electronically signed by Ella López MD on 6/6/2022 at 6:36 AM

## 2022-06-06 NOTE — CARE COORDINATION
6/6/22, 12:51 PM EDT  DISCHARGE PLANNING EVALUATION:    Donel Comment       Admitted: 6/4/2022/ 5 [facility] day: 2   Location: [facility] Reason for admit: Calculus of gallbladder with acute cholecystitis without obstruction [K80.00]  Acute cystitis with hematuria [N30.01]  Right upper quadrant abdominal pain [R10.11]  Leukocytosis, unspecified type [D72.829]  Acute cholecystitis due to biliary calculus [K80.00]   PMH:  has a past medical history of Heart murmur. Barriers to Discharge:  POD 1 Open Cholecystectomy/UTI    IV AB, IVF continued    Await BM    PCP: Siddharth Medina DO  Readmission Risk Score: 9.9 ( )%  Patient's Healthcare Decision Maker: Legal Next of Kin    Patient Goals/Plan/Treatment Preferences: denied needs as plans home w spouse [employer] independently as PTA  Transportation/Food Security/Housekeeping Addressed:  No issues identified.

## 2022-06-07 LAB
ANION GAP SERPL CALCULATED.3IONS-SCNC: 8 MEQ/L (ref 8–16)
BASOPHILS # BLD: 0.1 %
BASOPHILS ABSOLUTE: 0 THOU/MM3 (ref 0–0.1)
BLOOD CULTURE, ROUTINE: ABNORMAL
BLOOD CULTURE, ROUTINE: ABNORMAL
BUN BLDV-MCNC: 23 MG/DL (ref 7–22)
CALCIUM SERPL-MCNC: 8 MG/DL (ref 8.5–10.5)
CHLORIDE BLD-SCNC: 107 MEQ/L (ref 98–111)
CO2: 24 MEQ/L (ref 23–33)
CREAT SERPL-MCNC: 0.6 MG/DL (ref 0.4–1.2)
EOSINOPHIL # BLD: 0.7 %
EOSINOPHILS ABSOLUTE: 0.1 THOU/MM3 (ref 0–0.4)
ERYTHROCYTE [DISTWIDTH] IN BLOOD BY AUTOMATED COUNT: 13.5 % (ref 11.5–14.5)
ERYTHROCYTE [DISTWIDTH] IN BLOOD BY AUTOMATED COUNT: 47.4 FL (ref 35–45)
GFR SERPL CREATININE-BSD FRML MDRD: > 90 ML/MIN/1.73M2
GLUCOSE BLD-MCNC: 93 MG/DL (ref 70–108)
HCT VFR BLD CALC: 27.2 % (ref 37–47)
HCT VFR BLD CALC: 28.7 % (ref 37–47)
HCT VFR BLD CALC: 30 % (ref 37–47)
HCT VFR BLD CALC: 30.9 % (ref 37–47)
HEMOGLOBIN: 10 GM/DL (ref 12–16)
HEMOGLOBIN: 8.7 GM/DL (ref 12–16)
HEMOGLOBIN: 9 GM/DL (ref 12–16)
HEMOGLOBIN: 9.5 GM/DL (ref 12–16)
IMMATURE GRANS (ABS): 0.05 THOU/MM3 (ref 0–0.07)
IMMATURE GRANULOCYTES: 0.5 %
LYMPHOCYTES # BLD: 23.6 %
LYMPHOCYTES ABSOLUTE: 2.1 THOU/MM3 (ref 1–4.8)
MCH RBC QN AUTO: 29.8 PG (ref 26–33)
MCHC RBC AUTO-ENTMCNC: 31.4 GM/DL (ref 32.2–35.5)
MCV RBC AUTO: 95 FL (ref 81–99)
MONOCYTES # BLD: 7.9 %
MONOCYTES ABSOLUTE: 0.7 THOU/MM3 (ref 0.4–1.3)
NUCLEATED RED BLOOD CELLS: 0 /100 WBC
ORGANISM: ABNORMAL
PLATELET # BLD: 366 THOU/MM3 (ref 130–400)
PMV BLD AUTO: 10.4 FL (ref 9.4–12.4)
POTASSIUM REFLEX MAGNESIUM: 3.9 MEQ/L (ref 3.5–5.2)
RBC # BLD: 3.02 MILL/MM3 (ref 4.2–5.4)
SEG NEUTROPHILS: 67.2 %
SEGMENTED NEUTROPHILS ABSOLUTE COUNT: 6.1 THOU/MM3 (ref 1.8–7.7)
SODIUM BLD-SCNC: 139 MEQ/L (ref 135–145)
WBC # BLD: 9.1 THOU/MM3 (ref 4.8–10.8)

## 2022-06-07 PROCEDURE — 85014 HEMATOCRIT: CPT

## 2022-06-07 PROCEDURE — 80048 BASIC METABOLIC PNL TOTAL CA: CPT

## 2022-06-07 PROCEDURE — 6360000002 HC RX W HCPCS: Performed by: SURGERY

## 2022-06-07 PROCEDURE — 6370000000 HC RX 637 (ALT 250 FOR IP): Performed by: SURGERY

## 2022-06-07 PROCEDURE — 99024 POSTOP FOLLOW-UP VISIT: CPT | Performed by: SURGERY

## 2022-06-07 PROCEDURE — 99233 SBSQ HOSP IP/OBS HIGH 50: CPT | Performed by: INTERNAL MEDICINE

## 2022-06-07 PROCEDURE — 2500000003 HC RX 250 WO HCPCS: Performed by: SURGERY

## 2022-06-07 PROCEDURE — 85018 HEMOGLOBIN: CPT

## 2022-06-07 PROCEDURE — 85025 COMPLETE CBC W/AUTO DIFF WBC: CPT

## 2022-06-07 PROCEDURE — 2060000000 HC ICU INTERMEDIATE R&B

## 2022-06-07 PROCEDURE — 2580000003 HC RX 258: Performed by: SURGERY

## 2022-06-07 PROCEDURE — 36415 COLL VENOUS BLD VENIPUNCTURE: CPT

## 2022-06-07 RX ADMIN — HYDROCODONE BITARTRATE AND ACETAMINOPHEN 2 TABLET: 5; 325 TABLET ORAL at 02:57

## 2022-06-07 RX ADMIN — SODIUM CHLORIDE: 9 INJECTION, SOLUTION INTRAVENOUS at 06:59

## 2022-06-07 RX ADMIN — PIPERACILLIN AND TAZOBACTAM 3375 MG: 3; .375 INJECTION, POWDER, LYOPHILIZED, FOR SOLUTION INTRAVENOUS at 18:45

## 2022-06-07 RX ADMIN — PIPERACILLIN AND TAZOBACTAM 3375 MG: 3; .375 INJECTION, POWDER, LYOPHILIZED, FOR SOLUTION INTRAVENOUS at 02:53

## 2022-06-07 RX ADMIN — PIPERACILLIN AND TAZOBACTAM 3375 MG: 3; .375 INJECTION, POWDER, LYOPHILIZED, FOR SOLUTION INTRAVENOUS at 10:48

## 2022-06-07 RX ADMIN — SODIUM CHLORIDE, PRESERVATIVE FREE 40 ML: 5 INJECTION INTRAVENOUS at 21:04

## 2022-06-07 RX ADMIN — FAMOTIDINE 20 MG: 10 INJECTION, SOLUTION INTRAVENOUS at 08:04

## 2022-06-07 RX ADMIN — FAMOTIDINE 20 MG: 10 INJECTION, SOLUTION INTRAVENOUS at 21:42

## 2022-06-07 RX ADMIN — HYDROCODONE BITARTRATE AND ACETAMINOPHEN 1 TABLET: 5; 325 TABLET ORAL at 17:37

## 2022-06-07 RX ADMIN — HYDROCODONE BITARTRATE AND ACETAMINOPHEN 2 TABLET: 5; 325 TABLET ORAL at 21:41

## 2022-06-07 RX ADMIN — SODIUM CHLORIDE, PRESERVATIVE FREE 10 ML: 5 INJECTION INTRAVENOUS at 08:04

## 2022-06-07 ASSESSMENT — PAIN SCALES - GENERAL
PAINLEVEL_OUTOF10: 6
PAINLEVEL_OUTOF10: 0
PAINLEVEL_OUTOF10: 4
PAINLEVEL_OUTOF10: 4

## 2022-06-07 ASSESSMENT — PAIN DESCRIPTION - ORIENTATION
ORIENTATION: MID
ORIENTATION: LEFT

## 2022-06-07 ASSESSMENT — PAIN DESCRIPTION - DESCRIPTORS
DESCRIPTORS: SORE
DESCRIPTORS: ACHING

## 2022-06-07 ASSESSMENT — PAIN DESCRIPTION - LOCATION
LOCATION: ABDOMEN
LOCATION: ABDOMEN

## 2022-06-07 NOTE — PROGRESS NOTES
Patient completed 1 lap around [facility] pod without difficulty. Will continue to monitor.

## 2022-06-07 NOTE — PROGRESS NOTES
Rivera Leigh MD  Postoperative Progress Note  Pt Name: Deb Vilchis  Medical Record Number: [id number]  Date of Birth 1965   Today's Date: 6/7/2022  ASSESSMENT   1. POD #2 robotic assisted laparoscopic cholecystectomy converted to open for acute gangrenous calculus cholecystitis  2. UTI present on admission  3. Tachycardia resolved  4. Elevated liver function test secondary to #1. Improving postop.   has a past medical history of Heart murmur. PLANS   1. Analgesics and antiemetics as needed. Continue IV Zosyn today. If continues to do well will discharge home tomorrow on oral antibiotics  2. IV hydration per primary service. Can wean as oral intake is good  3.  diet as tolerated  4. Increase activity  5. Lovenox for DVT prophylaxis found. Had some bloody drainage from her right upper quadrant wound. Hemoglobin appears stable. Fascia intact. Patient ambulating and has SCDs on  6. GI prophylaxis  7. Status with patient may need to go home with drain in place. Kirby Ga is doing better today. She is tolerating oral intake. Drain output is minimal.  Does not appear bilious. Abdomen is soft. She did have some bloody drainage from her wound but no active drainage at this time hemoglobin is stable. CURRENT MEDICATIONS   Scheduled Meds:   [Held by provider] enoxaparin  30 mg SubCUTAneous BID    sodium chloride flush  5-40 mL IntraVENous 2 times per day    famotidine (PEPCID) injection  20 mg IntraVENous BID    insulin lispro  0-6 Units SubCUTAneous Q4H    piperacillin-tazobactam  3,375 mg IntraVENous Q8H     Continuous Infusions:   sodium chloride 100 mL/hr at 06/07/22 0659    dextrose      sodium chloride       PRN Meds:. HYDROcodone 5 mg - acetaminophen **OR** HYDROcodone 5 mg - acetaminophen, HYDROmorphone, glucose, dextrose bolus **OR** dextrose bolus, glucagon (rDNA), dextrose, sodium chloride flush, sodium chloride, ondansetron **OR**

## 2022-06-07 NOTE — PLAN OF CARE
Problem: Pain  Goal: Verbalizes/displays adequate comfort level or baseline comfort level  Outcome: Progressing     Problem: Gastrointestinal - Adult  Goal: Minimal or absence of nausea and vomiting  Outcome: Progressing     Problem: Infection - Adult  Goal: Absence of infection at discharge  Outcome: Progressing

## 2022-06-07 NOTE — CARE COORDINATION
Collaborative Discharge Planning    Can Li  :  1965  MRN:  [id number]    ADMIT DATE:  2022      Discharge Planning Discharge Planning  Meds-to-Beds: Does the patient want to have any new prescriptions delivered to bedside prior to discharge?: Yes  Patient expects to be discharged to[de-identified] [employer] Board Notes /Social Work Whiteboard Notes  /Social Work Whiteboard:  CM; plans home w spouse Jaspreet    Discharge Plan Home with family  plans home w spouse [employer] independently as PTA; monitor med assist as self-pay status; Public Benefits following  Discharge Milestones and Delays: Clinical status  POD 2 Open Cholecystectomy/UTI     IV AB, IVF continued     Await BM      SIGNED:  eTrry Hilario RN   2022, 11:13 AM

## 2022-06-07 NOTE — PROGRESS NOTES
Hospitalist      Patient:  Connor Busby    Unit/Bed:4K-10/010-A  YOB: 1965  MRN: [id number]   Acct: [de-identified]     PCP: David Sweet DO  Date of Admission: 6/4/2022        Assessment and Plan:        1. Acute cholecystitis due to biliary calculus: General surgery has been consulted, will start on IV fluids, IV antibiotic, blood cultures, daily CBC with differential and BMP, EKG (potential preop need), ? MRCP  2. Right upper quadrant abdominal pain secondary to #1  3. Leukocytosis/lactic acidosis/tachycardia- SIRS  4. Acute cystitis with hematuria: We will obtain urine culture, patient is currently on Zosyn  5. Hyperglycemia we will check hemoglobin A1c, and urine for microalbumin  6. Nausea and vomiting: Secondary to #1, IV fluids, antiemetics      6.5.2022 patient seen this a.m. states she had a difficult night secondary to pain. Patient states that surgery is planned for 11 AM today. Nonsignificant improvement in WBC still markedly elevated, increased alk phos ALT AST and bilirubin    6.6.2022 seen this a.m. complaining of headache/migraine usually takes Excedrin at home, has p.o. pain meds ordered. Liver enzymes are improving, WBC counts improving. Blood glucose levels are stable. Blood 1 of 2 +ve most likely contaminant    6.7.2022 patient seen this a.m. no complaints, states she is passing a little gas, but no bowel movement yet. Events during the night noted. Hemoglobin decreased 3 g since admission. Hematoma present at incision site. Abdominal binder in place. We will hold Lovenox and place SCDs. CC: Right upper quadrant of abdomen pain/blood in urine    HPI: 49-year-old obese white female who presents for evaluation of right upper quadrant abdominal pain for the past 2 nights. Patient's pain initially is on the upper abdomen and goes across from the epigastric area to the left to right, pain has been intermittent.   Yesterday the the patient has been vomiting all day and today now the pain is consistently on the right quadrant of the abdomen. Denies any fever however had chills, patient also noted blood in her urine however denies any dysuria, frequency, urgency, no vaginal discharge or vaginal bleeding. Patient's pain is 5 out of 10 gets worse as time goes on 10/10 when she does movements or lying down on her back. Denies any constipation, patient is been seen in urgent care and has laboratory data showing white count of 25,000 and a CT scan of the abdomen showing acute Evie cystitis and urine positive for nitrates. No past medical history    ROS (14 point review of systems completed. Pertinent positives noted. Otherwise ROS is negative) : Abdominal pain, nausea and vomiting hematuria    PMH:  Per HPI and       Diagnosis Date    Heart murmur      SHX:        Procedure Laterality Date    CHOLECYSTECTOMY, LAPAROSCOPIC N/A 6/5/2022    ROBOTIC CHOLECYSTECTOMY CONVERTED TO OPEN PROCEDURE performed by Dandre Wilson MD at [address]: History reviewed. No pertinent family history. SOCHX:   Social History     Socioeconomic History    Marital status:      Spouse name: None    Number of children: None    Years of education: None    Highest education level: None   Occupational History    None   Tobacco Use    Smoking status: Never Smoker    Smokeless tobacco: Never Used   Vaping Use    Vaping Use: Never used   Substance and Sexual Activity    Alcohol use: No    Drug use: No    Sexual activity: None   Other Topics Concern    None   Social History Narrative    None     Social Determinants of Health     Financial Resource Strain:     Difficulty of Paying Living Expenses: Not on file   Food Insecurity:     Worried About Running Out of Food in the Last Year: Not on file    Ti of Food in the Last Year: Not on file   Transportation Needs:     Lack of Transportation (Medical):  Not on file    Lack of Transportation (Non-Medical): Not on file   Physical Activity:     Days of Exercise per Week: Not on file    Minutes of Exercise per Session: Not on file   Stress:     Feeling of Stress : Not on file   Social Connections:     Frequency of Communication with Friends and Family: Not on file    Frequency of Social Gatherings with Friends and Family: Not on file    Attends Roman [employer] Services: Not on file    Active Member of [address] [place] [address] or Organizations: Not on file    Attends Club or Organization Meetings: Not on file    Marital Status: Not on file   Intimate Partner Violence:     Fear of Current or Ex-Partner: Not on file    Emotionally Abused: Not on file    Physically Abused: Not on file    Sexually Abused: Not on file   Housing Stability:     Unable to Pay for Housing in the Last Year: Not on file    Number of [place] in the Last Year: Not on file    Unstable Housing in the Last Year: Not on file      Allergies: Sulfa antibiotics  Medications:     sodium chloride 100 mL/hr at 06/07/22 0659    dextrose      sodium chloride        [Held by provider] enoxaparin  30 mg SubCUTAneous BID    sodium chloride flush  5-40 mL IntraVENous 2 times per day    famotidine (PEPCID) injection  20 mg IntraVENous BID    insulin lispro  0-6 Units SubCUTAneous Q4H    piperacillin-tazobactam  3,375 mg IntraVENous Q8H     Prior to Admission medications    Medication Sig Start Date End Date Taking?  Authorizing Provider   ZINC SULFATE PO Take 1 tablet by mouth daily   Yes Historical Provider, MD   MILK THISTLE PO Take 1 tablet by mouth daily   Yes Historical Provider, MD   TURMERIC PO Take 1 capsule by mouth daily   Yes Historical Provider, MD   Aspirin-Acetaminophen-Caffeine (EXCEDRIN PO) Take 1 tablet by mouth every 8 hours as needed    Yes Historical Provider, MD      PHYSICAL EXAM:    /63   Pulse 90   Temp 97.8 °F (36.6 °C) (Oral)   Resp (!) 83   Ht 5' 2\" (1.575 m)   Wt 231 lb (104.8 kg)   SpO2 93%   BMI 42.25 kg/m² General appearance:  No apparent distress, appears stated age and cooperative. HEENT:  Normal cephalic, atraumatic without obvious deformity. Pupils equal, round, and reactive to light. Extra ocular muscles intact. Conjunctivae/corneas clear. Neck: Supple, with full range of motion. no jugular venous distention. Trachea midline. no carotid bruits  Respiratory:  Normal respiratory effort. Clear to auscultation, bilaterally without Rales/Wheezes/Rhonchi. Breath sounds equal bilaterally  Cardiovascular:  Regular rate and rhythm with normal S1/S2 without murmurs, rubs or gallops. PMI non displaced  Abdomen: Soft, RUQ tender, non-distended with normal bowel sounds. No guarding, rebound. Musculoskeletal:  No clubbing, cyanosis or edema bilaterally. Full range of motion without deformity. Skin: Skin color, texture, turgor normal.  No rashes or lesions, or suspicious lesions. Neurologic:  Neurovascularly intact without any focal sensory/motor deficits. Cranial nerves: II-XII intact, grossly non-focal.  Psychiatric:  Alert and oriented, thought content appropriate, normal insight  Capillary Refill: Brisk,< 2 seconds   Peripheral Pulses: +2 palpable, equal bilaterally upper and lower extremities  Lymphatics: no lymphadenopathy    Data: (All radiographs, tracings, PFTs, and imaging are personally viewed and interpreted unless otherwise noted).    Recent Labs     06/05/22 0340 06/05/22 0340 06/06/22  0441 06/06/22  0441 06/06/22  1758 06/07/22  0220 06/07/22  0723   WBC 24.1*  --  15.1*  --   --  9.1  --    HGB 12.2   < > 9.7*   < > 9.8* 9.0* 9.5*   HCT 37.5   < > 32.6*   < > 31.0* 28.7* 30.0*     --  326  --   --  366  --     < > = values in this interval not displayed.      Recent Labs     06/05/22  0340 06/06/22  0441 06/07/22  0220    135 139   K 4.3 4.2 3.9   CL 99 103 107   CO2 25 20* 24   BUN 32* 32* 23*   CREATININE 0.7 0.5 0.6   CALCIUM 9.4 8.1* 8.0*     Recent Labs     06/04/22  1407 06/05/22  0340 06/06/22  0441   AST 49* 206* 95*   ALT 44 156* 132*   BILIDIR 0.6* 2.0* 0.5*   BILITOT 1.5* 2.8* 1.1   ALKPHOS 95 138* 127*     No results for input(s): INR in the last 72 hours. No results for input(s): Karla Lager in the last 72 hours. Radiology reports-   CT ABDOMEN PELVIS WO CONTRAST Additional Contrast? None    Result Date: 6/4/2022  PROCEDURE: CT ABDOMEN PELVIS WO CONTRAST CLINICAL INFORMATION: 49-year-old female with right-sided abdominal pain for one week . COMPARISON: None. TECHNIQUE: Axial 5 mm CT images were obtained through the abdomen and pelvis. No contrast was given. Sagittal and coronal reconstructions were obtained. All CT scans at this facility use dose modulation, iterative reconstruction, and/or weight-based dosing when appropriate to reduce radiation dose to as low as reasonably achievable. FINDINGS: There is atelectasis at the bilateral lung bases. No pleural effusion or pneumothorax. Lack of IV contrast limits evaluation of the abdominal organs. The gallbladder contains calculi. It is diffusely thickened and there are extensive inflammatory changes in the adjacent fat. The stomach is fluid-filled. There is a small hiatal hernia. Fluid is tracking superiorly into the esophagus. The liver and spleen have smooth contours and are normal in size. The head of the pancreas is obscured due to the extensive inflammatory changes which appear to arise from the gallbladder. The utilized portions of the pancreas appear to be within normal limits. The adrenal glands are within normal limits. There is no hydronephrosis. No evidence of a small bowel obstruction. The transverse colon has a somewhat thickened appearance near the hepatic flexure thought to be secondary to inflammatory changes arising from the gallbladder. The uterus is present. The urinary bladder is nondistended. The aorta and the IVC are normal in caliber. The bones are intact. No acute fracture.      1. Findings of acute cholecystitis. There is extensive inflammatory haziness in the adjacent fat with reactive thickening of the adjacent portion of the colon. 2. There is a small hiatal hernia and there is fluid tracking superiorly into the esophagus. **This report has been created using voice recognition software. It may contain minor errors which are inherent in voice recognition technology. ** Final report electronically signed by Dr Tommie Hawk on 6/4/2022 12:52 PM    US GALLBLADDER RUQ    Result Date: 6/4/2022  PROCEDURE: US GALLBLADDER RUQ CLINICAL INFORMATION: RUQ abdominal pain COMPARISON: CT abdomen and pelvis from same day TECHNIQUE: Grayscale and color Doppler imaging of the gallbladder was performed. TECHNICAL DATA: Gallbladder - 12.97 x 3.94 x 5.00 cm Gallbladder Wall - 0.19 cm Common Duct - 0.46 cm Bermeo's Sign: positive FINDINGS: GALLBLADDER: No gallbladder wall thickening. Distended gallbladder. . Multiple gallstones are seen. Mild pericholecystic fluid is seen. Positive sonographic Bermeo's sign. COMMON DUCT: The common bile duct measures 5 mm and is not dilated. LIVER: 1. Visualized portions of the liver are unremarkable. 1. Distended gallbladder with multiple gallstones, positive sonographic Bermeo's sign and mild pericholecystic fluid. Findings relate to acute cholecystitis. **This report has been created using voice recognition software. It may contain minor errors which are inherent in voice recognition technology. ** Final report electronically signed by Dr Marizol Schneider on 6/4/2022 4:04 PM      Electronically signed by Namrata Mcneil DO on 6/7/2022 at 8:33 AM

## 2022-06-08 VITALS
OXYGEN SATURATION: 98 % | HEART RATE: 92 BPM | HEIGHT: 62 IN | WEIGHT: 231 LBS | DIASTOLIC BLOOD PRESSURE: 77 MMHG | TEMPERATURE: 98.4 F | SYSTOLIC BLOOD PRESSURE: 150 MMHG | RESPIRATION RATE: 20 BRPM | BODY MASS INDEX: 42.51 KG/M2

## 2022-06-08 LAB
ALBUMIN SERPL-MCNC: 2.9 G/DL (ref 3.5–5.1)
ALP BLD-CCNC: 130 U/L (ref 38–126)
ALT SERPL-CCNC: 65 U/L (ref 11–66)
ANION GAP SERPL CALCULATED.3IONS-SCNC: 8 MEQ/L (ref 8–16)
AST SERPL-CCNC: 30 U/L (ref 5–40)
BASOPHILS # BLD: 0.6 %
BASOPHILS ABSOLUTE: 0 THOU/MM3 (ref 0–0.1)
BILIRUB SERPL-MCNC: 0.5 MG/DL (ref 0.3–1.2)
BILIRUBIN DIRECT: < 0.2 MG/DL (ref 0–0.3)
BUN BLDV-MCNC: 16 MG/DL (ref 7–22)
CALCIUM SERPL-MCNC: 7.9 MG/DL (ref 8.5–10.5)
CHLORIDE BLD-SCNC: 106 MEQ/L (ref 98–111)
CO2: 25 MEQ/L (ref 23–33)
CREAT SERPL-MCNC: 0.6 MG/DL (ref 0.4–1.2)
EOSINOPHIL # BLD: 3.8 %
EOSINOPHILS ABSOLUTE: 0.3 THOU/MM3 (ref 0–0.4)
ERYTHROCYTE [DISTWIDTH] IN BLOOD BY AUTOMATED COUNT: 13.5 % (ref 11.5–14.5)
ERYTHROCYTE [DISTWIDTH] IN BLOOD BY AUTOMATED COUNT: 46 FL (ref 35–45)
GFR SERPL CREATININE-BSD FRML MDRD: > 90 ML/MIN/1.73M2
GLUCOSE BLD-MCNC: 86 MG/DL (ref 70–108)
HCT VFR BLD CALC: 27.2 % (ref 37–47)
HEMOGLOBIN: 8.8 GM/DL (ref 12–16)
IMMATURE GRANS (ABS): 0.07 THOU/MM3 (ref 0–0.07)
IMMATURE GRANULOCYTES: 1 %
LYMPHOCYTES # BLD: 36.8 %
LYMPHOCYTES ABSOLUTE: 2.6 THOU/MM3 (ref 1–4.8)
MCH RBC QN AUTO: 30.1 PG (ref 26–33)
MCHC RBC AUTO-ENTMCNC: 32.4 GM/DL (ref 32.2–35.5)
MCV RBC AUTO: 93.2 FL (ref 81–99)
MONOCYTES # BLD: 8.4 %
MONOCYTES ABSOLUTE: 0.6 THOU/MM3 (ref 0.4–1.3)
NUCLEATED RED BLOOD CELLS: 0 /100 WBC
PLATELET # BLD: 386 THOU/MM3 (ref 130–400)
PMV BLD AUTO: 9.8 FL (ref 9.4–12.4)
POTASSIUM REFLEX MAGNESIUM: 3.6 MEQ/L (ref 3.5–5.2)
RBC # BLD: 2.92 MILL/MM3 (ref 4.2–5.4)
SEG NEUTROPHILS: 49.4 %
SEGMENTED NEUTROPHILS ABSOLUTE COUNT: 3.5 THOU/MM3 (ref 1.8–7.7)
SODIUM BLD-SCNC: 139 MEQ/L (ref 135–145)
TOTAL PROTEIN: 5.3 G/DL (ref 6.1–8)
WBC # BLD: 7 THOU/MM3 (ref 4.8–10.8)

## 2022-06-08 PROCEDURE — 2580000003 HC RX 258: Performed by: SURGERY

## 2022-06-08 PROCEDURE — 99239 HOSP IP/OBS DSCHRG MGMT >30: CPT | Performed by: HOSPITALIST

## 2022-06-08 PROCEDURE — 36415 COLL VENOUS BLD VENIPUNCTURE: CPT

## 2022-06-08 PROCEDURE — 6370000000 HC RX 637 (ALT 250 FOR IP): Performed by: SURGERY

## 2022-06-08 PROCEDURE — 80076 HEPATIC FUNCTION PANEL: CPT

## 2022-06-08 PROCEDURE — 99024 POSTOP FOLLOW-UP VISIT: CPT | Performed by: SURGERY

## 2022-06-08 PROCEDURE — 80048 BASIC METABOLIC PNL TOTAL CA: CPT

## 2022-06-08 PROCEDURE — 85025 COMPLETE CBC W/AUTO DIFF WBC: CPT

## 2022-06-08 PROCEDURE — 6360000002 HC RX W HCPCS: Performed by: SURGERY

## 2022-06-08 RX ORDER — POLYETHYLENE GLYCOL 3350 17 G/17G
17 POWDER, FOR SOLUTION ORAL DAILY PRN
Qty: 527 G | Refills: 1 | COMMUNITY
Start: 2022-06-08 | End: 2022-06-13

## 2022-06-08 RX ORDER — AMOXICILLIN AND CLAVULANATE POTASSIUM 875; 125 MG/1; MG/1
1 TABLET, FILM COATED ORAL 2 TIMES DAILY
Qty: 14 TABLET | Refills: 0 | Status: SHIPPED | OUTPATIENT
Start: 2022-06-08 | End: 2022-06-15

## 2022-06-08 RX ORDER — HYDROCODONE BITARTRATE AND ACETAMINOPHEN 5; 325 MG/1; MG/1
1 TABLET ORAL EVERY 6 HOURS PRN
Qty: 24 TABLET | Refills: 0 | Status: SHIPPED | OUTPATIENT
Start: 2022-06-08 | End: 2022-06-11

## 2022-06-08 RX ADMIN — PIPERACILLIN AND TAZOBACTAM 3375 MG: 3; .375 INJECTION, POWDER, LYOPHILIZED, FOR SOLUTION INTRAVENOUS at 02:14

## 2022-06-08 RX ADMIN — HYDROCODONE BITARTRATE AND ACETAMINOPHEN 1 TABLET: 5; 325 TABLET ORAL at 06:37

## 2022-06-08 ASSESSMENT — PAIN SCALES - GENERAL
PAINLEVEL_OUTOF10: 2
PAINLEVEL_OUTOF10: 4

## 2022-06-08 ASSESSMENT — PAIN DESCRIPTION - LOCATION: LOCATION: ABDOMEN

## 2022-06-08 ASSESSMENT — PAIN DESCRIPTION - DESCRIPTORS: DESCRIPTORS: SORE

## 2022-06-08 NOTE — PLAN OF CARE
Problem: Pain  Goal: Verbalizes/displays adequate comfort level or baseline comfort level  Outcome: Progressing     Problem: Gastrointestinal - Adult  Goal: Minimal or absence of nausea and vomiting  Outcome: Progressing     Problem: Safety - Adult  Goal: Free from fall injury  Outcome: Progressing  Flowsheets (Taken 6/7/2022 2039)  Free From Fall Injury: Instruct family/caregiver on patient safety

## 2022-06-08 NOTE — CARE COORDINATION
6/8/22, 11:21 AM EDT    Patient goals/plan/ treatment preferences discussed by  and . Patient goals/plan/ treatment preferences reviewed with patient/ family. Patient/ family verbalize understanding of discharge plan and are in agreement with goal/plan/treatment preferences. Understanding was demonstrated using the teach back method. AVS provided by RN at time of discharge, which includes all necessary medical information pertaining to the patients current course of illness, treatment, post-discharge goals of care, and treatment preferences.      Services At/After Discharge: None

## 2022-06-08 NOTE — DISCHARGE SUMMARY
Discharge Summary    Name:  Tatiana Conley /Age/Sex: 1965  (60 y.o. female)   MRN & CSN:  [id number] & [id number] Admission Date/Time: 2022 11:33 AM   Attending:  Hunter Houston MD Discharging Physician: Hunter Houston MD     Hospital Course:   Tatiana Conley is a 60 y.o.  female  who presents with Acute cholecystitis due to biliary calculus    60years old female was admitted because of right upper quadrant abdominal pain and was found to have acute cholecystitis, underwent robotic assisted laparoscopic cholecystectomy which converted to open due to acute gangrenous calculus cholecystitis, started on IV antibiotic IV fluid, patient also had evidence of elevated liver function test due to cholecystitis, LFT trending down and stable on day of discharge. Patient had evidence of SIRS with leukocytosis lactic acid and tachycardia  Patient stable on the day of discharge and was discharged on oral antibiotics and pain medication  Surgical drain removed and the patient tolerating oral diet well and patient has bowel movement  Patient need to follow-up with the general surgery office in 2 weeks  Encourage activity  Hemoglobin stable on discharge  Patient also has evidence of the UTI to complete course of oral antibiotic after discharge    The patient expressed appropriate understanding of and agreement with the discharge recommendations, medications, and plan.      Consults this admission:  [address] TO GENERAL SURGERY    Discharge Instruction:   Follow up appointments: follow up with G surgery in 2 weeks   Primary care physician:  within 1  weeks    Diet:  regular diet   Activity: activity as tolerated  Disposition: Discharged to:   [x]Home, []C, []SNF, []Acute Rehab, []Hospice   Condition on discharge: Stable    Discharge Medications:        Medication List      START taking these medications    amoxicillin-clavulanate 875-125 MG per tablet  Commonly known as: AUGMENTIN  Take 1 tablet by mouth 2 times daily for 7 days     HYDROcodone-acetaminophen 5-325 MG per tablet  Commonly known as: NORCO  Take 1 tablet by mouth every 6 hours as needed for Pain for up to 3 days. polyethylene glycol 17 g packet  Commonly known as: GLYCOLAX  Take 17 g by mouth daily as needed for Constipation        CONTINUE taking these medications    MILK THISTLE PO     TURMERIC PO     ZINC SULFATE PO        STOP taking these medications    EXCEDRIN PO           Where to Get Your Medications      These medications were sent to [address], [address] [facility]  [address]  [address] [facility], [address] [place] [address] [address] [address]    Phone: [phone]   · amoxicillin-clavulanate 875-125 MG per tablet  · HYDROcodone-acetaminophen 5-325 MG per tablet     You can get these medications from any pharmacy    You don't need a prescription for these medications  · polyethylene glycol 17 g packet         Objective Findings at Discharge:   BP (!) 150/77   Pulse 92   Temp 98.4 °F (36.9 °C) (Oral)   Resp 20   Ht 5' 2\" (1.575 m)   Wt 231 lb (104.8 kg)   SpO2 98%   BMI 42.25 kg/m²            PHYSICAL EXAM   GEN Awake female, sitting upright in bed in no apparent distress. Appears given age. EYES Pupils are equally round. No scleral erythema, discharge, or conjunctivitis. HENT Mucous membranes are moist. Oral pharynx without exudates, no evidence of thrush. NECK Supple, no apparent thyromegaly or masses. RESP Clear to auscultation, no wheezes, rales or rhonchi. Symmetric chest movement while on room air. CARDIO/VASC S1/S2 auscultated. Regular rate without appreciable murmurs, rubs, or gallops. No JVD or carotid bruits. Peripheral pulses equal bilaterally and palpable. No peripheral edema. GI Abdomen is soft without significant tenderness, masses, or guarding. Bowel sounds are normoactive. Rectal exam deferred.    MSK No gross joint deformities. SKIN Normal coloration, warm, dry. NEURO Cranial nerves appear grossly intact, normal speech, no lateralizing weakness. PSYCH Awake, alert, oriented x 4. Affect appropriate. BMP/CBC  Recent Labs     06/06/22  0441 06/06/22  1758 06/07/22  0220 06/07/22  0723 06/07/22  1523 06/07/22  2258 06/08/22  0503     --  139  --   --   --  139   K 4.2  --  3.9  --   --   --  3.6     --  107  --   --   --  106   CO2 20*  --  24  --   --   --  25   BUN 32*  --  23*  --   --   --  16   CREATININE 0.5  --  0.6  --   --   --  0.6   WBC 15.1*  --  9.1  --   --   --  7.0   HCT 32.6*   < > 28.7*   < > 30.9* 27.2* 27.2*     --  366  --   --   --  386    < > = values in this interval not displayed.        IMAGING:       Discharge Time of 35  minutes    Electronically signed by Heather Herrera MD on 6/8/2022 at 11:04 AM

## 2022-06-08 NOTE — PLAN OF CARE
Problem: Discharge Planning  Goal: Discharge to home or other facility with appropriate resources  Outcome: Progressing  Note: Communication with treatment team and patient about discharge plan and appropriate resources. Problem: Pain  Goal: Verbalizes/displays adequate comfort level or baseline comfort level  6/8/2022 0744 by Arben Ortiz RN  Outcome: Progressing  Note: Patient displays improved well-being such as baseline levels for pulse, BP, respirations and relaxed muscle tone or body posture. Monitor using 0-10 pain scale. Problem: Safety - Adult  Goal: Free from fall injury  6/8/2022 0744 by Arben Ortiz RN  Outcome: Progressing  Note: The patient has been assessed for falls and the room room has been assessed for hazards to safety. Hand hygeine has been performed upon additional hazards to their safety. Will continue to monitor and assess throughout my hourly rounding.

## 2022-06-08 NOTE — PROGRESS NOTES
Rivera Leigh MD  Postoperative Progress Note  Pt Name: Jaquelin Davis  Medical Record Number: [id number]  Date of Birth 1965   Today's Date: 6/8/2022  ASSESSMENT   1. POD #3 robotic assisted laparoscopic cholecystectomy converted to open for acute gangrenous calculus cholecystitis  2. UTI/cystitis present on admission  3. Tachycardia resolved  4. Elevated liver function test secondary to #1. resolved   has a past medical history of Heart murmur. PLANS   1. Analgesics and antiemetics as needed. Okay to discharge from surgical standpoint. Hemoglobin is stable and no signs of any active bleeding. White count is normal.  Oral antibiotics ordered at discharge. 2. I continue IV fluids  3.  diet as tolerated  4. Increase activity  5. CDs VTE prophylaxis   6. remove drain  7. Follow-up surgical office 2 weeks for staple removal.  Jack Perkins is doing very well she is sitting up eating her breakfast.  Abdomen soft incisions intact no signs of any active bleeding or drainage. Drain output serous. Remove prior to discharge. Okay for discharge from surgical standpoint. Instructions and prescriptions written. Follow-up with me in the office in 2 weeks. CURRENT MEDICATIONS   Scheduled Meds:   [Held by provider] enoxaparin  30 mg SubCUTAneous BID    sodium chloride flush  5-40 mL IntraVENous 2 times per day    famotidine (PEPCID) injection  20 mg IntraVENous BID    insulin lispro  0-6 Units SubCUTAneous Q4H     Continuous Infusions:   dextrose      sodium chloride       PRN Meds:. HYDROcodone 5 mg - acetaminophen **OR** HYDROcodone 5 mg - acetaminophen, HYDROmorphone, glucose, dextrose bolus **OR** dextrose bolus, glucagon (rDNA), dextrose, sodium chloride flush, sodium chloride, ondansetron **OR** ondansetron, polyethylene glycol, acetaminophen **OR** acetaminophen, potassium chloride **OR** potassium alternative oral replacement **OR** potassium --   --  25   BUN 32*  --  23*  --   --   --  16   CREATININE 0.5  --  0.6  --   --   --  0.6   CALCIUM 8.1*  --  8.0*  --   --   --  7.9*    < > = values in this interval not displayed. No results for input(s): PTT, INR in the last 72 hours. Invalid input(s): PT  Recent Labs     06/06/22  0441   AST 95*   *   BILITOT 1.1   BILIDIR 0.5*     No results for input(s): TROPONINT in the last 72 hours. RADIOLOGY     US GALLBLADDER RUQ   Final Result   1. Distended gallbladder with multiple gallstones, positive sonographic Bermeo's sign and mild pericholecystic fluid. Findings relate to acute cholecystitis. **This report has been created using voice recognition software. It may contain minor errors which are inherent in voice recognition technology. **      Final report electronically signed by Dr Margaret Pimentel on 6/4/2022 4:04 PM      CT ABDOMEN PELVIS WO CONTRAST Additional Contrast? None   Final Result      1. Findings of acute cholecystitis. There is extensive inflammatory haziness in the adjacent fat with reactive thickening of the adjacent portion of the colon. 2. There is a small hiatal hernia and there is fluid tracking superiorly into the esophagus. **This report has been created using voice recognition software. It may contain minor errors which are inherent in voice recognition technology. **      Final report electronically signed by Dr Kaylah Chi on 6/4/2022 12:52 PM          Electronically signed by Janny Faria MD on 6/8/2022 at 8:10 AM

## 2022-06-09 ENCOUNTER — TELEPHONE (OUTPATIENT)
Dept: FAMILY MEDICINE CLINIC | Age: 57
End: 2022-06-09

## 2022-06-09 NOTE — TELEPHONE ENCOUNTER
----- Message from Giovanni Pompa sent at 2022 10:59 AM EDT -----  Subject: Appointment Request    Reason for Call: Urgent (Patient Request) Hospital Follow Up    QUESTIONS  Type of Appointment? New Patient/New to Provider  Reason for appointment request? Available appointments did not meet   patient need  Additional Information for Provider? Patient would like to establish care   with Costa Hernandez. The 1st available appointment he has is . She   wants to ask him if he would see her sooner than that. He is a family   friend. Also sees her . She was in [facility]. José Miguel Was discharged . Had gallbladder surgery. Please call to advise.   ---------------------------------------------------------------------------  --------------  CALL BACK INFO  What is the best way for the office to contact you? OK to leave message on   voicemail  Preferred Call Back Phone Number? [phone]  ---------------------------------------------------------------------------  --------------  SCRIPT ANSWERS  Relationship to Patient? Self  Specialty Confirmation? Primary Care  Is this the first appointment to establish care for a ? No  Have you been diagnosed with, awaiting test results for, or told that you   are suspected of having COVID-19 (Coronavirus)? (If patient has tested   negative or was tested as a requirement for work, school, or travel and   not based on symptoms, answer no)? No  Within the past 10 days have you developed any of the following symptoms   (answer no if symptoms have been present longer than 10 days or began   more than 10 days ago)? Fever or Chills, Cough, Shortness of breath or   difficulty breathing, Loss of taste or smell, Sore throat, Nasal   congestion, Sneezing or runny nose, Fatigue or generalized body aches   (answer no if pain is specific to a body part e.g. back pain), Diarrhea,   Headache? No  Have you had close contact with someone with COVID-19 in the last 7 days?    No  (Service

## 2022-06-09 NOTE — TELEPHONE ENCOUNTER
She can be placed on the schedule anytime she prefers as an inpatient. Please block the visit appointment behind her appointment.

## 2022-06-10 LAB — BLOOD CULTURE, ROUTINE: NORMAL

## 2022-06-13 ENCOUNTER — OFFICE VISIT (OUTPATIENT)
Dept: FAMILY MEDICINE CLINIC | Age: 57
End: 2022-06-13

## 2022-06-13 VITALS
RESPIRATION RATE: 16 BRPM | WEIGHT: 230 LBS | OXYGEN SATURATION: 97 % | DIASTOLIC BLOOD PRESSURE: 66 MMHG | TEMPERATURE: 98.6 F | BODY MASS INDEX: 40.75 KG/M2 | SYSTOLIC BLOOD PRESSURE: 132 MMHG | HEIGHT: 63 IN | HEART RATE: 100 BPM

## 2022-06-13 DIAGNOSIS — Z98.890 S/P LAPAROSCOPY: ICD-10-CM

## 2022-06-13 DIAGNOSIS — Z09 HOSPITAL DISCHARGE FOLLOW-UP: Primary | ICD-10-CM

## 2022-06-13 DIAGNOSIS — M19.90 ARTHRITIS: ICD-10-CM

## 2022-06-13 DIAGNOSIS — Z12.31 ENCOUNTER FOR SCREENING MAMMOGRAM FOR MALIGNANT NEOPLASM OF BREAST: ICD-10-CM

## 2022-06-13 PROCEDURE — 99202 OFFICE O/P NEW SF 15 MIN: CPT | Performed by: NURSE PRACTITIONER

## 2022-06-13 RX ORDER — PREDNISONE 20 MG/1
20 TABLET ORAL 2 TIMES DAILY
Qty: 14 TABLET | Refills: 0 | Status: SHIPPED | OUTPATIENT
Start: 2022-06-13 | End: 2022-06-18

## 2022-06-13 SDOH — ECONOMIC STABILITY: FOOD INSECURITY: WITHIN THE PAST 12 MONTHS, YOU WORRIED THAT YOUR FOOD WOULD RUN OUT BEFORE YOU GOT MONEY TO BUY MORE.: NEVER TRUE

## 2022-06-13 SDOH — ECONOMIC STABILITY: FOOD INSECURITY: WITHIN THE PAST 12 MONTHS, THE FOOD YOU BOUGHT JUST DIDN'T LAST AND YOU DIDN'T HAVE MONEY TO GET MORE.: NEVER TRUE

## 2022-06-13 ASSESSMENT — ENCOUNTER SYMPTOMS
COUGH: 0
VOMITING: 0
SORE THROAT: 0
NAUSEA: 0
EYE REDNESS: 0
EYE PAIN: 0
ALLERGIC/IMMUNOLOGIC NEGATIVE: 1
CONSTIPATION: 0
ABDOMINAL PAIN: 0
BACK PAIN: 0
RHINORRHEA: 0
DIARRHEA: 0
EYE DISCHARGE: 0
WHEEZING: 0
TROUBLE SWALLOWING: 0
SHORTNESS OF BREATH: 0

## 2022-06-13 ASSESSMENT — PATIENT HEALTH QUESTIONNAIRE - PHQ9
1. LITTLE INTEREST OR PLEASURE IN DOING THINGS: 0
SUM OF ALL RESPONSES TO PHQ QUESTIONS 1-9: 0
2. FEELING DOWN, DEPRESSED OR HOPELESS: 0
SUM OF ALL RESPONSES TO PHQ QUESTIONS 1-9: 0
SUM OF ALL RESPONSES TO PHQ QUESTIONS 1-9: 0
SUM OF ALL RESPONSES TO PHQ9 QUESTIONS 1 & 2: 0
SUM OF ALL RESPONSES TO PHQ QUESTIONS 1-9: 0

## 2022-06-13 ASSESSMENT — SOCIAL DETERMINANTS OF HEALTH (SDOH): HOW HARD IS IT FOR YOU TO PAY FOR THE VERY BASICS LIKE FOOD, HOUSING, MEDICAL CARE, AND HEATING?: NOT HARD AT ALL

## 2022-06-13 NOTE — PROGRESS NOTES
[address] [address]  Dept: [phone]  Dept Fax: [phone]  Loc: [phone]       TRAVIS Busby is a 60 y. o.female here to be established as a new patient. She is 5 days post cholecystectomy. Doing well with minimal pain, not taking Norco at this point. Mild intermittent diarrhea and mild constipation alternating. Eating fairly soft foods that are not heavy and smaller amounts along the way through the day. No indication of fever or night sweats. Patient complains of several days of left ankle and lower leg pain that is somewhat improved over the last couple days. Patient states that she has this off and on over time and it seems to go down at night while she is sleeping and then comes back during the day. Does try to wear compression stockings when she travels. Patient's main concern is chronic multiple joint pain over time that she is taking Aleve for almost every day. Patient Active Problem List   Diagnosis   (none) - all problems resolved or deleted       Current Outpatient Medications   Medication Sig Dispense Refill    predniSONE (DELTASONE) 20 MG tablet Take 1 tablet by mouth 2 times daily for 5 days 14 tablet 0    amoxicillin-clavulanate (AUGMENTIN) 875-125 MG per tablet Take 1 tablet by mouth 2 times daily for 7 days 14 tablet 0    MILK THISTLE PO Take 1 tablet by mouth daily (Patient not taking: Reported on 6/13/2022)      TURMERIC PO Take 1 capsule by mouth daily (Patient not taking: Reported on 6/13/2022)       No current facility-administered medications for this visit. Review of Systems   Constitutional: Negative for activity change, fatigue and fever. HENT: Negative for congestion, ear pain, rhinorrhea, sore throat and trouble swallowing. Eyes: Negative for pain, discharge and redness.    Respiratory: Negative for cough, shortness of breath and wheezing. Cardiovascular: Positive for leg swelling. Gastrointestinal: Negative for abdominal pain, constipation, diarrhea, nausea and vomiting. Endocrine: Negative. Genitourinary: Negative for dysuria, frequency and urgency. Musculoskeletal: Positive for joint swelling. Negative for back pain and myalgias. Skin: Negative for rash. Allergic/Immunologic: Negative. Neurological: Negative for dizziness, tremors, weakness and headaches. Hematological: Negative. Psychiatric/Behavioral: Negative for dysphoric mood and sleep disturbance. The patient is not nervous/anxious. OBJECTIVE     /66 (Site: Right Upper Arm)   Pulse 100   Temp 98.6 °F (37 °C) (Oral)   Resp 16   Ht 5' 3\" (1.6 m)   Wt 230 lb (104.3 kg)   SpO2 97%   BMI 40.74 kg/m²     Wt Readings from Last 3 Encounters:   06/13/22 230 lb (104.3 kg)   06/04/22 231 lb (104.8 kg)   09/08/17 210 lb (95.3 kg)     Body mass index is 40.74 kg/m². Physical Exam  Vitals reviewed. Constitutional:       General: She is not in acute distress. Appearance: Normal appearance. She is well-developed. She is not toxic-appearing or diaphoretic. HENT:      Head: Normocephalic. No right periorbital erythema or left periorbital erythema. Jaw: No trismus. Right Ear: Hearing and external ear normal.      Left Ear: Hearing and external ear normal.      Nose: Nose normal. No mucosal edema or rhinorrhea. Mouth/Throat:      Mouth: Mucous membranes are moist.      Dentition: Normal dentition. Pharynx: Uvula midline. No posterior oropharyngeal erythema. Tonsils: No tonsillar exudate. 0 on the right. 0 on the left. Eyes:      General: Lids are normal.         Right eye: No discharge. Left eye: No discharge. Conjunctiva/sclera: Conjunctivae normal.      Right eye: Right conjunctiva is not injected. No chemosis. Left eye: No chemosis. Pupils: Pupils are equal, round, and reactive to light. Neck:      Vascular: No JVD. Trachea: Trachea normal.   Cardiovascular:      Rate and Rhythm: Normal rate and regular rhythm. Heart sounds: Murmur heard. Pulmonary:      Effort: Pulmonary effort is normal. No respiratory distress. Breath sounds: Normal breath sounds. No stridor. No wheezing. Abdominal:      General: Bowel sounds are normal. There is no distension. Palpations: Abdomen is soft. Tenderness: There is no abdominal tenderness. Musculoskeletal:         General: No tenderness or signs of injury. Normal range of motion. Cervical back: Full passive range of motion without pain and normal range of motion. Left lower leg: Edema present. Lymphadenopathy:      Cervical: No cervical adenopathy. Skin:     General: Skin is warm and dry. Capillary Refill: Capillary refill takes less than 2 seconds. Findings: No rash. Neurological:      Mental Status: She is alert and oriented to person, place, and time. GCS: GCS eye subscore is 4. GCS verbal subscore is 5. GCS motor subscore is 6. Cranial Nerves: No cranial nerve deficit. Coordination: Coordination normal.      Gait: Gait normal.   Psychiatric:         Mood and Affect: Mood is not anxious or depressed. Speech: Speech normal.         Behavior: Behavior normal. Behavior is not withdrawn or hyperactive. Behavior is cooperative. Thought Content:  Thought content normal.         Judgment: Judgment normal.         Lab Results   Component Value Date    LABA1C 5.2 06/04/2022       No results found for: CHOL, TRIG, HDL, LDLCALC, LDLDIRECT    The ASCVD Risk score (Estrellita Davies et al., 2013) failed to calculate for the following reasons:    Cannot find a previous HDL lab    Cannot find a previous total cholesterol lab    Lab Results   Component Value Date     06/08/2022    K 3.6 06/08/2022     06/08/2022    CO2 25 06/08/2022    BUN 16 06/08/2022    CREATININE 0.6 06/08/2022 GLUCOSE 86 06/08/2022    CALCIUM 7.9 (L) 06/08/2022    PROT 5.3 (L) 06/08/2022    LABALBU 2.9 (L) 06/08/2022    BILITOT 0.5 06/08/2022    ALKPHOS 130 (H) 06/08/2022    AST 30 06/08/2022    ALT 65 06/08/2022    LABGLOM >90 06/08/2022       No results found for: LABMICR, [id number]    No results found for: TSH, K1ILKLM, E5YRPAY, THYROIDAB    Lab Results   Component Value Date    WBC 7.0 06/08/2022    HGB 8.8 (L) 06/08/2022    HCT 27.2 (L) 06/08/2022    MCV 93.2 06/08/2022     06/08/2022       No results found for: PSA, PSADIA    Immunization History   Administered Date(s) Administered    Hepatitis A Vaccine 01/10/2003    Td vaccine (adult) 01/10/2003, 07/11/2012       Health Maintenance   Topic Date Due    Depression Screen  Never done    Cervical cancer screen  Never done    Lipids  Never done    Colorectal Cancer Screen  Never done    DTaP/Tdap/Td vaccine (1 - Tdap) 07/12/2012    Shingles vaccine (1 of 2) Never done    Breast cancer screen  11/11/2013    COVID-19 Vaccine (1) 06/13/2023 (Originally 10/15/1970)    Flu vaccine (Season Ended) 09/01/2022    Hepatitis A vaccine  Aged Out    Hepatitis B vaccine  Aged Out    Hib vaccine  Aged Out    Meningococcal (ACWY) vaccine  Aged Out    Pneumococcal 0-64 years Vaccine  Aged Out    Hepatitis C screen  Discontinued    HIV screen  Discontinued       Future Appointments   Date Time Provider Dorinda Eaton   6/23/2022  9:30 AM MD [facility] [address]       ASSESSMENT       Diagnosis Orders   1. Hospital discharge follow-up     2. S/P laparoscopy     3. Encounter for screening mammogram for malignant neoplasm of breast  TIANA DIGITAL SCREEN W OR WO CAD BILATERAL   4. Arthritis  predniSONE (DELTASONE) 20 MG tablet       PLAN      Patient has follow-up with surgeon next week. Incision lines very stable staples are in place, no erythema or indication of infection.     Patient is prescribed a course of prednisone that she may try small amounts intermittently in place for relief to see if this helps relieve her pain we will follow-up on that going forward. Will continue to monitor her left ankle and lower leg swelling. Patient is self-pay for medical care.       Electronically signed by SAJI Gaitan CNP on 6/13/2022 at 1:48 PM

## 2022-06-15 NOTE — PROGRESS NOTES
CLINICAL PHARMACY NOTE: MEDS TO BEDS    Total # of Prescriptions Filled: 2   The following medications were delivered to the patient:  Augmentin 875/125mg  Hydrocodone/apap 5/325mg    Additional Documentation:

## 2022-06-23 ENCOUNTER — OFFICE VISIT (OUTPATIENT)
Dept: SURGERY | Age: 57
End: 2022-06-23

## 2022-06-23 VITALS
SYSTOLIC BLOOD PRESSURE: 118 MMHG | HEIGHT: 63 IN | OXYGEN SATURATION: 98 % | TEMPERATURE: 98.8 F | WEIGHT: 228 LBS | DIASTOLIC BLOOD PRESSURE: 80 MMHG | BODY MASS INDEX: 40.4 KG/M2 | HEART RATE: 102 BPM

## 2022-06-23 DIAGNOSIS — K80.43 CALCULUS OF BILE DUCT WITH ACUTE CHOLECYSTITIS AND OBSTRUCTION: Primary | ICD-10-CM

## 2022-06-23 DIAGNOSIS — Z09 POSTOP CHECK: ICD-10-CM

## 2022-06-23 DIAGNOSIS — D62 ACUTE BLOOD LOSS AS CAUSE OF POSTOPERATIVE ANEMIA: ICD-10-CM

## 2022-06-23 PROCEDURE — 99024 POSTOP FOLLOW-UP VISIT: CPT | Performed by: SURGERY

## 2022-06-23 NOTE — PROGRESS NOTES
Marilin Nguyen MD   General Surgery  Postprocedure Evaluation in Office  Pt Name: Avila Main  Date of Birth 1965   Today's Date: 6/23/2022  Medical Record Number: [id number]  Primary Care Provider: SAJI Ibarra CNP  Chief Complaint   Patient presents with    Post-Op Check     ROBOTIC CHOLECYSTECTOMY CONVERTED TO OPEN PROCEDURE 6/5/22     ASSESSMENT      1. Calculus of bile duct with acute cholecystitis and obstruction    2. Acute blood loss as cause of postoperative anemia    3. Postop check         PLAN       1. Recommend colonoscopy . Cologuard recommended by PCP. 2. Staples removed  3. Follow up prn in surgical clinic. Recommend no lifting over 30 pounds for 6 weeks. Call with any wound questions or concerns. Austen Cline is seen today for post-op follow-up. She is status post laparoscopy with conversion to open cholecystectomy for severe acute gangrenous cholecystitis. She has mild intermittent diarrhea but it is not chronic. She has never had a colonoscopy. Offered to make referral if she desired she declined at this point. She states Cologuard was mentioned at her PCP visit. He recently established care with SUNDEEP Fry. She did have some postoperative anemia. She states she feels good and declined follow-up labs. Order was given. Denies any nausea or vomiting appetite is good. Drain some old hematoma from medial aspect of wound no signs of infection. Staples removed without complication.   Medications    Current Outpatient Medications:     ferrous fumarate-vitamin c (ARANZA-SEQUELS) 65-25 MG TBCR CR tablet, Take 1 tablet by mouth daily (with breakfast), Disp: , Rfl:     MILK THISTLE PO, Take 1 tablet by mouth daily (Patient not taking: Reported on 6/13/2022), Disp: , Rfl:     TURMERIC PO, Take 1 capsule by mouth daily (Patient not taking: Reported on 6/13/2022), Disp: , Rfl:     Allergies  Allergies   Allergen Reactions    Sulfa Antibiotics Rash Review of Systems  History obtained from the patient. Constitutional: Denies any fever, chills, fatigue. Wound: Denies any rash, skin color changes or wound problems. Resp: Denies any cough, shortness of breath. CV: Denies any chest pain, orthopnea or syncope. GI: Positive for incisional discomfort only. Denies any nausea, vomiting, blood in the stool, constipation or diarrhea. : Denies any hematuria, hesitancy or dysuria. OBJECTIVE     VITALS: /80 (Site: Left Upper Arm, Position: Sitting, Cuff Size: Medium Adult)   Pulse (!) 102   Temp 98.8 °F (37.1 °C)   Ht 5' 3\" (1.6 m)   Wt 228 lb (103.4 kg)   SpO2 98%   BMI 40.39 kg/m²     CONSTITUTIONAL: Alert and oriented times 3, no acute distress and cooperative to examination. SKIN: Skin color, texture, turgor normal. No rashes or lesions. INCISION: wound margins intact and healing well. No signs of infection. Old dark minimal bloody drainage medial aspect wound drainage. LUNGS: Lungs Clear  CARDIOVASCULAR: Normal Rate  ABDOMEN: Soft minimal incisional tenderness.    NEUROLOGIC: No sensory or motor nerve irritation                 Narrative  Performed by: Ankita Mart. Pathology     Vivian Bae                [id number]   Assoc.                                              Page 1 of 1   [address]    [address] [place] [address]                                                       PROC: 2022   [facility]                                    RECV: 2022   [facility]                                    RPTD: 2022   [address] [place] [address]                       MRN:  [id number]    LOC: 4KS                       ACCT: [de-identified]  SEX: F                       : 1965  AGE: 56 Y                          PATHOLOGY REPORT                       ATTN: Daniele Mccauley                       REQ: [address] OLT       Copies To:   Daniele Mccauley; ANTWON WAGNER       Clinical Information: ABD PAIN     FINAL DIAGNOSIS: Gallbladder, cholecystectomy:     Erosive, necrotizing acute and chronic cholecystitis.     Cholelithiasis. Specimen:   GALLBLADDER       Gross Examination:   The container is labeled Jen Denver, gallbladder.  Received in   formalin is a gallbladder measuring 8 cm in length x about 4 cm in   diameter.  The surface is red-green and dusky. Jered Honour is a large defect   in the wall.  The cystic duct margin is not grossly identified.  The   wall measures about 0.5 cm in greatest thickness and the mucosal   surface is darkly bile-stained.  The gallbladder is necrotic appearing.    Separate in the specimen container are numerous yellow-green   multifaceted stones and abundance of blood clot.  Representative   sections are submitted.  1 ss.  SIO/DKR:[email or web address]     Microscopic Examination:   Microscopic examination was performed. [id number]                                                       <Sign Out Dr. Maggi Wang M.D., F.C.A.P.        NVML/ [place]  Printed on:  2022   Raoul Huang, [facility]   Original print date: 2022                          Narrative  Performed by: [address] Pathology     [facility]                [id number]   Assoc.                                              Page 1 of 2 [address]   [address] [facility] [address]                                                       PROC: 2022   [facility]                                    RECV: 2022   [facility] [place] [facility]""                                    RPTD: 2022   [address] [facility] [address]                       MRN:  [id number]    LOC: 4KS                       ACCT: [de-identified]  SEX: F                       : 1965  AGE: 56 Y                          PATHOLOGY REPORT                       ATTN: Fela Rodriguez                       REQ: Cayetano JORDAN       Copies To:   Fela WAGNER       Clinical Information: ABD PAIN     FINAL DIAGNOSIS:   Gallbladder, cholecystectomy:     Erosive, necrotizing acute and chronic cholecystitis.     Cholelithiasis. Specimen:   GALLBLADDER       Gross Examination:   The container is labeled Radha De Oliveira, marcie.  Received in   formalin is a gallbladder measuring 8 cm in length x about 4 cm in   diameter.  The surface is red-green and dusky. Kermit Orellana is a large defect   in the wall.  The cystic duct margin is not grossly identified.  The   wall measures about 0.5 cm in greatest thickness and the mucosal   surface is darkly bile-stained.  The gallbladder is necrotic appearing.    Separate in the specimen container are numerous yellow-green   multifaceted stones and abundance of blood clot.  Representative   sections are submitted.  1 ss.  SIO/DKR:[id number]     Microscopic Examination:   Microscopic examination was performed. [id number]                                                       <Sign Out Dr. Christy Lea M.D., F.C.A.P.        NVML/ [address] [address]  Printed on: [address] [facility]JANEE, [address] [facility]   Original print date: 06/07/2022      Specimen Collected: 06/05/22 07:59 Last Resulted: 06/07/22 09:01

## 2022-07-13 ENCOUNTER — TELEPHONE (OUTPATIENT)
Dept: SURGERY | Age: 57
End: 2022-07-13

## 2022-07-14 ENCOUNTER — OFFICE VISIT (OUTPATIENT)
Dept: SURGERY | Age: 57
End: 2022-07-14

## 2022-07-14 VITALS
DIASTOLIC BLOOD PRESSURE: 88 MMHG | HEART RATE: 94 BPM | SYSTOLIC BLOOD PRESSURE: 122 MMHG | RESPIRATION RATE: 18 BRPM | WEIGHT: 228.1 LBS | OXYGEN SATURATION: 99 % | HEIGHT: 63 IN | BODY MASS INDEX: 40.41 KG/M2 | TEMPERATURE: 97.2 F

## 2022-07-14 DIAGNOSIS — K80.43 CALCULUS OF BILE DUCT WITH ACUTE CHOLECYSTITIS AND OBSTRUCTION: Primary | ICD-10-CM

## 2022-07-14 DIAGNOSIS — Z09 POSTOP CHECK: ICD-10-CM

## 2022-07-14 PROCEDURE — 99024 POSTOP FOLLOW-UP VISIT: CPT | Performed by: SURGERY

## 2022-07-14 RX ORDER — NAPROXEN SODIUM 220 MG
220 TABLET ORAL PRN
COMMUNITY

## 2022-07-14 NOTE — PROGRESS NOTES
Lucien Adams MD   General Surgery  Postprocedure Evaluation in Office  Pt Name: Omar Vo  Date of Birth 1965   Today's Date: 7/14/2022  Medical Record Number: [id number]  Primary Care Provider: SAJI Chaney CNP  Chief Complaint   Patient presents with    Post-Op Check     ROBOTIC CHOLECYSTECTOMY CONVERTED TO OPEN PROCEDURE 6/5/22 Last seen in the office on 6/23/2022 drainage from umbilicus     ASSESSMENT      1. Calculus of bile duct with acute cholecystitis and obstruction    2. Postop check         PLAN       1. Recommend colonoscopy . Cologuard recommended by PCP. 2.  Wounds are intact. Had some mild serous drainage from the umbilicus. And some erythema but has cleared. She has been treating with topical antibiotic ointment. Right upper quadrant incision intact. Medial area consistent with resolving hematoma. 3.  Patient declined to get H&H after last visit. She states she feels great she still does not want to obtain any lab work at this time. No lifting over 30 pounds for 4 more weeks recommended. 4.  Follow-up surgical clinic as needed. Georgi Sheikh is seen today for post-op follow-up. She is status post laparoscopy with conversion to open cholecystectomy for severe acute gangrenous cholecystitis. She has mild intermittent diarrhea but it is not chronic. She has never had a colonoscopy. Offered to make referral if she desired she declined at this point. She states Cologuard was mentioned at her PCP visit. He recently established care with SUNDEEP Terrell. She did have some postoperative anemia. She states she feels good and declined follow-up labs. Order was given. Denies any nausea or vomiting appetite is good. Drain some old hematoma from medial aspect of wound no signs of infection. Staples removed without complication. Interval history: Jeremi Lopez here called for a recheck. Had some drainage from the umbilicus. There was some erythema. Spontaneously drained what sounded like a small seroma she has been cleaning the area and treating with topical antibiotic ointment. No current evidence of infection. Right upper quadrant incision intact. She feels some nodular area medially this is old hematoma which is resolving. No evidence of any hernia. Her appetite is good. Denies any fever or chills. Medications    Current Outpatient Medications:     ferrous fumarate-vitamin c (ARANZA-SEQUELS) 65-25 MG TBCR CR tablet, Take 1 tablet by mouth daily (with breakfast), Disp: , Rfl:     MILK THISTLE PO, Take 1 tablet by mouth daily (Patient not taking: Reported on 6/13/2022), Disp: , Rfl:     TURMERIC PO, Take 1 capsule by mouth daily (Patient not taking: Reported on 6/13/2022), Disp: , Rfl:     Allergies  Allergies   Allergen Reactions    Sulfa Antibiotics Rash       Review of Systems  History obtained from the patient. Constitutional: Denies any fever, chills, fatigue. Wound: Denies any rash, skin color changes or wound problems. Resp: Denies any cough, shortness of breath. CV: Denies any chest pain, orthopnea or syncope. GI: Positive for mild incisional discomfort only. Denies any nausea, vomiting, blood in the stool, constipation or diarrhea. : Denies any hematuria, hesitancy or dysuria. OBJECTIVE     VITALS: /88 (Site: Right Upper Arm, Position: Sitting, Cuff Size: Medium Adult)   Pulse 94   Temp 97.2 °F (36.2 °C) (Temporal)   Resp 18   Ht 5' 3\" (1.6 m)   Wt 228 lb 1.6 oz (103.5 kg)   SpO2 99%   BMI 40.41 kg/m²     CONSTITUTIONAL: Alert and oriented times 3, no acute distress and cooperative to examination. SKIN: Skin color, texture, turgor normal. No rashes or lesions. INCISION: wound margins intact and healing well. No signs of infection. No drainage from right upper quadrant or umbilical wound at this time. Hematoma resolving medial aspect right upper quadrant as scheduled and skin intact.   LUNGS: Lungs Clear  CARDIOVASCULAR: Normal Rate  ABDOMEN: Soft minimal incisional tenderness. NEUROLOGIC: No sensory or motor nerve irritation                 Narrative  Performed by: [facility] Vern Mart. Pathology     [facility]                [id number]   Assoc.                                              Page 1 of 4 [address]   [address] [place], [place] [address]                                                       PROC: 2022   [facility]                                    RECV: 2022   [address]                                    RPTD: 2022   [address] [place], [place] [address]                       MRN:  [id number]    LOC: 4KS                       ACCT: [de-identified]  SEX: F                       : 1965  AGE: 60 Y                          PATHOLOGY REPORT                       ATTN: Jack Keith                       REQ: Dandre JORDAN       Copies To:   Jack Keith; ANTWON WAGNER       Clinical Information: ABD PAIN     FINAL DIAGNOSIS:   Gallbladder, cholecystectomy:     Erosive, necrotizing acute and chronic cholecystitis.     Cholelithiasis. Specimen:   GALLBLADDER       Gross Examination:   The container is labeled Kristine Atkinson, gallbladder.  Received in   formalin is a gallbladder measuring 8 cm in length x about 4 cm in   diameter.  The surface is red-green and dusky. Yoko Section is a large defect   in the wall.  The cystic duct margin is not grossly identified.  The   wall measures about 0.5 cm in greatest thickness and the mucosal   surface is darkly bile-stained.  The gallbladder is necrotic appearing.    Separate in the specimen container are numerous yellow-green   multifaceted stones and abundance of blood clot.  Representative   sections are submitted.  1 ss.  SIO/DKR:[email or web address]     Microscopic Examination:   Microscopic examination was performed.    [id number]                                                       <Sign Out Dr. Jeimy Man Cordelia Church M.D., MARICARMENP. NVML/ [address] [facility] [address]  Printed on:  2022   Shima Paredes [address]   [address] [facility], [address]   Original print date: 2022                          Narrative  Performed by: [address] Pathology     Susan Noelle                [id number]   Assoc.                                              Page 1 of 1   [address]   [address] [place], [place] [address]                                                       PROC: 2022   [facility]/. [facility]                                    RECV: 2022   [address]                                    RPTD: 2022   [address] [place], [place] [address]                       MRN:  [id number]    LOC: 4KS                       ACCT: [de-identified]  SEX: F                       : 1965  AGE: 60 Y                          PATHOLOGY REPORT                       ATTN: Tami Sigala                       REQ: [address] [facility] OLT       Copies To:   Tami Sigala; ANTWON WAGNER       Clinical Information: ABD PAIN     FINAL DIAGNOSIS:   Gallbladder, cholecystectomy:     Erosive, necrotizing acute and chronic cholecystitis.     Cholelithiasis. Specimen:   GALLBLADDER       Gross Examination:   The container is labeled Ehsan Harper, gallbladder.  Received in   formalin is a gallbladder measuring 8 cm in length x about 4 cm in   diameter.  The surface is red-green and dusky. Nayan Sera is a large defect   in the wall.  The cystic duct margin is not grossly identified.  The   wall measures about 0.5 cm in greatest thickness and the mucosal   surface is darkly bile-stained.  The gallbladder is necrotic appearing.    Separate in the specimen container are numerous yellow-green   multifaceted stones and abundance of blood clot.  Representative   sections are submitted.  1 ss.  SIO/DKR:[id number]     Microscopic Examination:   Microscopic examination was performed.    [id number]                                                       <Sign Out Dr. Brain Coyle                                              Marjorie Bolden M.D., F.C.A.P.        NVML/ [facility]  Printed on: [address] [facility], [address] [facility] [address]   Original print date: 06/07/2022      Specimen Collected: 06/05/22 07:59 Last Resulted: 06/07/22 09:01

## 2023-01-12 DIAGNOSIS — M19.90 ARTHRITIS: Primary | ICD-10-CM

## 2023-01-12 RX ORDER — PREDNISONE 20 MG/1
20 TABLET ORAL 2 TIMES DAILY
Qty: 10 TABLET | Refills: 0 | Status: SHIPPED | OUTPATIENT
Start: 2023-01-12 | End: 2023-01-17

## 2024-01-16 ENCOUNTER — PROCEDURE VISIT (OUTPATIENT)
Dept: NEUROLOGY | Age: 59
End: 2024-01-16

## 2024-01-16 DIAGNOSIS — R20.0 BILATERAL HAND NUMBNESS: ICD-10-CM

## 2024-01-16 DIAGNOSIS — M54.12 CERVICAL RADICULOPATHY: ICD-10-CM

## 2024-01-16 DIAGNOSIS — G56.03 BILATERAL CARPAL TUNNEL SYNDROME: Primary | ICD-10-CM

## 2024-01-16 PROCEDURE — 95911 NRV CNDJ TEST 9-10 STUDIES: CPT | Performed by: PSYCHIATRY & NEUROLOGY

## 2024-01-16 PROCEDURE — 95886 MUSC TEST DONE W/N TEST COMP: CPT | Performed by: PSYCHIATRY & NEUROLOGY

## 2024-04-18 ENCOUNTER — OFFICE VISIT (OUTPATIENT)
Dept: FAMILY MEDICINE CLINIC | Age: 59
End: 2024-04-18

## 2024-04-18 VITALS
HEIGHT: 63 IN | WEIGHT: 240 LBS | DIASTOLIC BLOOD PRESSURE: 80 MMHG | RESPIRATION RATE: 16 BRPM | TEMPERATURE: 98.5 F | SYSTOLIC BLOOD PRESSURE: 130 MMHG | HEART RATE: 86 BPM | OXYGEN SATURATION: 95 % | BODY MASS INDEX: 42.52 KG/M2

## 2024-04-18 DIAGNOSIS — G89.29 CHRONIC RIGHT HIP PAIN: Primary | ICD-10-CM

## 2024-04-18 DIAGNOSIS — M25.551 CHRONIC RIGHT HIP PAIN: Primary | ICD-10-CM

## 2024-04-18 PROCEDURE — 99214 OFFICE O/P EST MOD 30 MIN: CPT | Performed by: NURSE PRACTITIONER

## 2024-04-18 PROCEDURE — 96372 THER/PROPH/DIAG INJ SC/IM: CPT | Performed by: NURSE PRACTITIONER

## 2024-04-18 RX ORDER — KETOROLAC TROMETHAMINE 30 MG/ML
60 INJECTION, SOLUTION INTRAMUSCULAR; INTRAVENOUS ONCE
Status: COMPLETED | OUTPATIENT
Start: 2024-04-18 | End: 2024-04-18

## 2024-04-18 RX ORDER — PREDNISONE 20 MG/1
20 TABLET ORAL 2 TIMES DAILY
Qty: 10 TABLET | Refills: 0 | Status: SHIPPED | OUTPATIENT
Start: 2024-04-18 | End: 2024-04-23

## 2024-04-18 RX ADMIN — KETOROLAC TROMETHAMINE 60 MG: 30 INJECTION, SOLUTION INTRAMUSCULAR; INTRAVENOUS at 08:28

## 2024-04-18 SDOH — ECONOMIC STABILITY: FOOD INSECURITY: WITHIN THE PAST 12 MONTHS, THE FOOD YOU BOUGHT JUST DIDN'T LAST AND YOU DIDN'T HAVE MONEY TO GET MORE.: NEVER TRUE

## 2024-04-18 SDOH — ECONOMIC STABILITY: INCOME INSECURITY: HOW HARD IS IT FOR YOU TO PAY FOR THE VERY BASICS LIKE FOOD, HOUSING, MEDICAL CARE, AND HEATING?: NOT HARD AT ALL

## 2024-04-18 SDOH — ECONOMIC STABILITY: HOUSING INSECURITY
IN THE LAST 12 MONTHS, WAS THERE A TIME WHEN YOU DID NOT HAVE A STEADY PLACE TO SLEEP OR SLEPT IN A SHELTER (INCLUDING NOW)?: NO

## 2024-04-18 SDOH — ECONOMIC STABILITY: FOOD INSECURITY: WITHIN THE PAST 12 MONTHS, YOU WORRIED THAT YOUR FOOD WOULD RUN OUT BEFORE YOU GOT MONEY TO BUY MORE.: NEVER TRUE

## 2024-04-18 ASSESSMENT — PATIENT HEALTH QUESTIONNAIRE - PHQ9
2. FEELING DOWN, DEPRESSED OR HOPELESS: NOT AT ALL
SUM OF ALL RESPONSES TO PHQ QUESTIONS 1-9: 0
1. LITTLE INTEREST OR PLEASURE IN DOING THINGS: NOT AT ALL
SUM OF ALL RESPONSES TO PHQ QUESTIONS 1-9: 0
SUM OF ALL RESPONSES TO PHQ QUESTIONS 1-9: 0
SUM OF ALL RESPONSES TO PHQ9 QUESTIONS 1 & 2: 0
SUM OF ALL RESPONSES TO PHQ QUESTIONS 1-9: 0

## 2024-04-18 ASSESSMENT — ENCOUNTER SYMPTOMS
TROUBLE SWALLOWING: 0
EYE PAIN: 0
EYE REDNESS: 0
COUGH: 0
VOMITING: 0
BACK PAIN: 0
SORE THROAT: 0
ABDOMINAL PAIN: 0
RHINORRHEA: 0
DIARRHEA: 0
NAUSEA: 0
EYE DISCHARGE: 0
ALLERGIC/IMMUNOLOGIC NEGATIVE: 1
WHEEZING: 0
CONSTIPATION: 0
SHORTNESS OF BREATH: 0

## 2024-04-18 NOTE — PROGRESS NOTES
[facility] PROFESSIONAL SERVS  [facility]  [address] [address]  Dept: [phone]  Dept Fax: [phone]  Loc: [phone]     Visit Date:  4/18/2024      Patient:  Robbin Rodriguez  YOB: 1965    HPI:     Chief Complaint   Patient presents with    Hip Pain     Right hip pain worse since she has not been able to go to the pool in the last month from having carpel tunnel surgery.       Patient presents with complaint of a few weeks of right hip pain to the degree she has some trouble with turning, adduction and abduction.  Walking straight forward does not cause any pain, and the pain is sometimes more localized in the right groin area.  Does have a history of some hip trouble over the years but generally she spends time with water aerobics.  She recently carpal tunnel syndrome has been more sedate and now the pain is as worse as it has ever been.  It is on the right lateral side, sometimes back around in the posterior buttock region.  Pain does not radiate down her right leg.  She is never had any injury or surgery to this hip.        Medications    Current Outpatient Medications:     predniSONE (DELTASONE) 20 MG tablet, Take 1 tablet by mouth 2 times daily for 5 days, Disp: 10 tablet, Rfl: 0    Cholecalciferol (VITAMIN D3) 125 MCG (5000 UT) TABS, Take 1 tablet by mouth daily, Disp: , Rfl:     naproxen sodium (ALEVE) 220 MG tablet, Take 1 tablet by mouth as needed for Pain, Disp: , Rfl:     ferrous fumarate-vitamin c (ARANZA-SEQUELS) 65-25 MG TBCR CR tablet, Take 1 tablet by mouth daily (with breakfast), Disp: , Rfl:     MILK THISTLE PO, Take 1 tablet by mouth daily , Disp: , Rfl:     TURMERIC PO, Take 1 capsule by mouth daily , Disp: , Rfl:     The patient is allergic to sulfa antibiotics.    Past Medical History  Robbin  has a past medical history of Headache, Hearing loss, Heart murmur, and Neuropathy.    Subjective:      Review of Systems

## 2024-04-18 NOTE — PROGRESS NOTES
Administrations This Visit       ketorolac (TORADOL) injection 60 mg       Admin Date  04/18/2024  08:28 Action  Given Dose  60 mg Route  IntraMUSCular Site  Dorsogluteal Right Administered By  Padmini Stone CMA    Ordering Provider: Joss Agrawal APRN - CNP    NDC: 41765-188-21    Lot#: [id number]    : [facility] [employer]    Patient Supplied?: No

## 2024-09-18 DIAGNOSIS — G89.29 CHRONIC RIGHT HIP PAIN: Primary | ICD-10-CM

## 2024-09-18 DIAGNOSIS — M25.551 CHRONIC RIGHT HIP PAIN: Primary | ICD-10-CM

## 2024-09-18 RX ORDER — TRAMADOL HYDROCHLORIDE 50 MG/1
50 TABLET ORAL EVERY 8 HOURS PRN
Qty: 15 TABLET | Refills: 0 | Status: SHIPPED | OUTPATIENT
Start: 2024-09-18 | End: 2024-09-23

## 2024-09-18 RX ORDER — PREDNISONE 20 MG/1
20 TABLET ORAL 2 TIMES DAILY
Qty: 10 TABLET | Refills: 0 | Status: SHIPPED | OUTPATIENT
Start: 2024-09-18 | End: 2024-09-23

## 2025-03-30 DIAGNOSIS — G89.29 CHRONIC RIGHT HIP PAIN: Primary | ICD-10-CM

## 2025-03-30 DIAGNOSIS — M25.551 CHRONIC RIGHT HIP PAIN: Primary | ICD-10-CM

## 2025-03-30 RX ORDER — HYDROCODONE BITARTRATE AND ACETAMINOPHEN 5; 325 MG/1; MG/1
1 TABLET ORAL EVERY 6 HOURS PRN
Qty: 20 TABLET | Refills: 0 | Status: SHIPPED | OUTPATIENT
Start: 2025-03-30 | End: 2025-03-30

## 2025-04-16 ASSESSMENT — ENCOUNTER SYMPTOMS
VOMITING: 0
EYE REDNESS: 0
ALLERGIC/IMMUNOLOGIC NEGATIVE: 1
COUGH: 0
WHEEZING: 0
NAUSEA: 0
BACK PAIN: 0
SORE THROAT: 0
EYE DISCHARGE: 0
SHORTNESS OF BREATH: 0
ABDOMINAL PAIN: 0
RHINORRHEA: 0
CONSTIPATION: 0
DIARRHEA: 0
EYE PAIN: 0
TROUBLE SWALLOWING: 0

## 2025-04-16 NOTE — PROGRESS NOTES
[facility]   [address] [address]  Dept: [phone]  Dept Fax: [phone]    SUBJECTIVE         Pt presents for PRE-OP physical exam. Surgery is scheduled for Right total hip replacement with Dr. Souza ([facility]) on 5/15/25.  General anesthesia is planned.  Current symptoms include severe pain.      CURRENT EXERCISE REGIMEN: none    PREVIOUS ANESTHESIA PROBLEMS?  yes    Current medical problems include   Patient Active Problem List   Diagnosis    Systolic murmur       Past Medical History:   Diagnosis Date    Headache have had all my life    Hearing loss tinnitus about 20 years    Heart murmur     Neuropathy A couple of years ago    Got bad in Sept 2023       Past Surgical History:   Procedure Laterality Date    ARM SURGERY      CHOLECYSTECTOMY, LAPAROSCOPIC N/A 6/5/2022    ROBOTIC CHOLECYSTECTOMY CONVERTED TO OPEN PROCEDURE performed by Magy Sandoval MD at [facility] OR    KNEE SURGERY      MANDIBLE SURGERY         Allergies   Allergen Reactions    Sulfa Antibiotics Rash         Current Outpatient Medications:     DICLOFENAC PO, Take by mouth, Disp: , Rfl:     traMADol (ULTRAM) 50 MG tablet, Take 1 tablet by mouth every 6 hours as needed for Pain for up to 7 days. Intended supply: 7 days. Take lowest dose possible to manage pain Max Daily Amount: 200 mg, Disp: 28 tablet, Rfl: 0        Family History   Problem Relation Age of Onset    Cancer Father     Prostate Cancer Brother     Cancer Maternal Grandfather     Diabetes Paternal Grandmother     Prostate Cancer Brother        Social History     Tobacco Use    Smoking status: Never    Smokeless tobacco: Never   Vaping Use    Vaping status: Never Used   Substance Use Topics    Alcohol use: Yes     Comment: I rarely have any - an ocassional glass of wine    Drug use: Never       Review of Systems   Constitutional:  Negative for activity change, fatigue and fever.   HENT:  Negative for congestion, ear pain, rhinorrhea, sore throat and

## 2025-04-17 ENCOUNTER — OFFICE VISIT (OUTPATIENT)
Dept: FAMILY MEDICINE CLINIC | Age: 60
End: 2025-04-17

## 2025-04-17 VITALS
TEMPERATURE: 98.4 F | RESPIRATION RATE: 16 BRPM | DIASTOLIC BLOOD PRESSURE: 82 MMHG | OXYGEN SATURATION: 98 % | SYSTOLIC BLOOD PRESSURE: 120 MMHG | HEART RATE: 92 BPM | WEIGHT: 232 LBS | BODY MASS INDEX: 41.1 KG/M2

## 2025-04-17 DIAGNOSIS — M25.551 CHRONIC RIGHT HIP PAIN: ICD-10-CM

## 2025-04-17 DIAGNOSIS — Z01.818 PREOP EXAMINATION: Primary | ICD-10-CM

## 2025-04-17 DIAGNOSIS — R01.1 SYSTOLIC MURMUR: ICD-10-CM

## 2025-04-17 DIAGNOSIS — G89.29 CHRONIC RIGHT HIP PAIN: ICD-10-CM

## 2025-04-17 DIAGNOSIS — Z12.31 ENCOUNTER FOR SCREENING MAMMOGRAM FOR BREAST CANCER: ICD-10-CM

## 2025-04-17 PROCEDURE — 99214 OFFICE O/P EST MOD 30 MIN: CPT | Performed by: NURSE PRACTITIONER

## 2025-04-17 PROCEDURE — 93000 ELECTROCARDIOGRAM COMPLETE: CPT | Performed by: NURSE PRACTITIONER

## 2025-04-17 RX ORDER — TRAMADOL HYDROCHLORIDE 50 MG/1
50 TABLET ORAL EVERY 6 HOURS PRN
Qty: 28 TABLET | Refills: 0 | Status: SHIPPED | OUTPATIENT
Start: 2025-04-17 | End: 2025-04-24

## 2025-04-17 SDOH — ECONOMIC STABILITY: FOOD INSECURITY: WITHIN THE PAST 12 MONTHS, YOU WORRIED THAT YOUR FOOD WOULD RUN OUT BEFORE YOU GOT MONEY TO BUY MORE.: NEVER TRUE

## 2025-04-17 SDOH — ECONOMIC STABILITY: FOOD INSECURITY: WITHIN THE PAST 12 MONTHS, THE FOOD YOU BOUGHT JUST DIDN'T LAST AND YOU DIDN'T HAVE MONEY TO GET MORE.: NEVER TRUE

## 2025-04-17 ASSESSMENT — PATIENT HEALTH QUESTIONNAIRE - PHQ9
SUM OF ALL RESPONSES TO PHQ QUESTIONS 1-9: 0
2. FEELING DOWN, DEPRESSED OR HOPELESS: NOT AT ALL
1. LITTLE INTEREST OR PLEASURE IN DOING THINGS: NOT AT ALL

## 2025-04-19 ENCOUNTER — LAB (OUTPATIENT)
Dept: LAB | Age: 60
End: 2025-04-19

## 2025-04-19 DIAGNOSIS — Z01.818 PREOP EXAMINATION: ICD-10-CM

## 2025-04-19 LAB
25(OH)D3 SERPL-MCNC: 44 NG/ML (ref 30–100)
ANION GAP SERPL CALC-SCNC: 9 MEQ/L (ref 8–16)
BACTERIA URNS QL MICRO: ABNORMAL /HPF
BASOPHILS ABSOLUTE: 0.1 THOU/MM3 (ref 0–0.1)
BASOPHILS NFR BLD AUTO: 1.6 %
BILIRUB UR QL STRIP.AUTO: NEGATIVE
BUN SERPL-MCNC: 22 MG/DL (ref 8–23)
CALCIUM SERPL-MCNC: 10 MG/DL (ref 8.8–10.2)
CASTS #/AREA URNS LPF: ABNORMAL /LPF
CASTS 2: ABNORMAL /LPF
CHARACTER UR: ABNORMAL
CHLORIDE SERPL-SCNC: 103 MEQ/L (ref 98–111)
CO2 SERPL-SCNC: 29 MEQ/L (ref 22–29)
COLOR, UA: YELLOW
CREAT SERPL-MCNC: 0.7 MG/DL (ref 0.5–0.9)
CRYSTALS URNS MICRO: ABNORMAL
DEPRECATED MEAN GLUCOSE BLD GHB EST-ACNC: 102 MG/DL (ref 70–126)
DEPRECATED RDW RBC AUTO: 48.1 FL (ref 35–45)
EOSINOPHIL NFR BLD AUTO: 3.9 %
EOSINOPHILS ABSOLUTE: 0.2 THOU/MM3 (ref 0–0.4)
EPITHELIAL CELLS, UA: ABNORMAL /HPF
ERYTHROCYTE [DISTWIDTH] IN BLOOD BY AUTOMATED COUNT: 14 % (ref 11.5–14.5)
GFR SERPL CREATININE-BSD FRML MDRD: > 90 ML/MIN/1.73M2
GLUCOSE SERPL-MCNC: 88 MG/DL (ref 74–109)
GLUCOSE UR QL STRIP.AUTO: NEGATIVE MG/DL
HBA1C MFR BLD HPLC: 5.4 % (ref 4–6)
HCT VFR BLD AUTO: 39.8 % (ref 37–47)
HGB BLD-MCNC: 13 GM/DL (ref 12–16)
HGB UR QL STRIP.AUTO: NEGATIVE
IMM GRANULOCYTES # BLD AUTO: 0.01 THOU/MM3 (ref 0–0.07)
IMM GRANULOCYTES NFR BLD AUTO: 0.2 %
KETONES UR QL STRIP.AUTO: NEGATIVE
LYMPHOCYTES ABSOLUTE: 2.3 THOU/MM3 (ref 1–4.8)
LYMPHOCYTES NFR BLD AUTO: 40.9 %
MCH RBC QN AUTO: 30.7 PG (ref 26–33)
MCHC RBC AUTO-ENTMCNC: 32.7 GM/DL (ref 32.2–35.5)
MCV RBC AUTO: 93.9 FL (ref 81–99)
MISCELLANEOUS 2: ABNORMAL
MONOCYTES ABSOLUTE: 0.5 THOU/MM3 (ref 0.4–1.3)
MONOCYTES NFR BLD AUTO: 8.9 %
NEUTROPHILS ABSOLUTE: 2.5 THOU/MM3 (ref 1.8–7.7)
NEUTROPHILS NFR BLD AUTO: 44.5 %
NITRITE UR QL STRIP: NEGATIVE
NRBC BLD AUTO-RTO: 0 /100 WBC
PH UR STRIP.AUTO: 7.5 [PH] (ref 5–9)
PLATELET # BLD AUTO: 397 THOU/MM3 (ref 130–400)
PMV BLD AUTO: 11 FL (ref 9.4–12.4)
POTASSIUM SERPL-SCNC: 4.7 MEQ/L (ref 3.5–5.2)
PROT UR STRIP.AUTO-MCNC: NEGATIVE MG/DL
RBC # BLD AUTO: 4.24 MILL/MM3 (ref 4.2–5.4)
RBC URINE: ABNORMAL /HPF
RENAL EPI CELLS #/AREA URNS HPF: ABNORMAL /[HPF]
SODIUM SERPL-SCNC: 141 MEQ/L (ref 135–145)
SP GR UR REFRACT.AUTO: 1.01 (ref 1–1.03)
UROBILINOGEN, URINE: 0.2 EU/DL (ref 0–1)
WBC # BLD AUTO: 5.6 THOU/MM3 (ref 4.8–10.8)
WBC #/AREA URNS HPF: ABNORMAL /HPF
WBC #/AREA URNS HPF: NEGATIVE /[HPF]
YEAST LIKE FUNGI URNS QL MICRO: ABNORMAL

## 2025-04-20 LAB — MECA+MECC+MREJ ISLT/SPM QL: NEGATIVE

## 2025-04-21 ENCOUNTER — RESULTS FOLLOW-UP (OUTPATIENT)
Dept: FAMILY MEDICINE CLINIC | Age: 60
End: 2025-04-21

## 2025-05-13 DIAGNOSIS — R01.1 HEART MURMUR: Primary | ICD-10-CM

## 2025-06-11 ENCOUNTER — TELEPHONE (OUTPATIENT)
Dept: FAMILY MEDICINE CLINIC | Age: 60
End: 2025-06-11

## 2025-06-11 DIAGNOSIS — N39.0 ACUTE UTI (URINARY TRACT INFECTION): Primary | ICD-10-CM

## 2025-06-11 RX ORDER — CEPHALEXIN 500 MG/1
500 CAPSULE ORAL 2 TIMES DAILY
Qty: 10 CAPSULE | Refills: 0 | Status: SHIPPED | OUTPATIENT
Start: 2025-06-11 | End: 2025-06-16

## 2025-06-11 NOTE — TELEPHONE ENCOUNTER
Patient calls with concern for UTI after having had hip surgery 3 weeks ago. Will be treated empirically.

## 2025-06-13 ENCOUNTER — TELEPHONE (OUTPATIENT)
Dept: FAMILY MEDICINE CLINIC | Age: 60
End: 2025-06-13

## 2025-06-13 ENCOUNTER — FOLLOWUP TELEPHONE ENCOUNTER (OUTPATIENT)
Dept: FAMILY MEDICINE CLINIC | Age: 60
End: 2025-06-13

## 2025-06-13 DIAGNOSIS — T81.49XA POSTOPERATIVE WOUND INFECTION OF RIGHT HIP: Primary | ICD-10-CM

## 2025-06-13 RX ORDER — CLINDAMYCIN HYDROCHLORIDE 300 MG/1
300 CAPSULE ORAL 3 TIMES DAILY
Qty: 30 CAPSULE | Refills: 0 | Status: SHIPPED | OUTPATIENT
Start: 2025-06-13 | End: 2025-06-13 | Stop reason: SDUPTHER

## 2025-06-13 RX ORDER — CLINDAMYCIN HYDROCHLORIDE 300 MG/1
300 CAPSULE ORAL 3 TIMES DAILY
Qty: 30 CAPSULE | Refills: 0 | Status: SHIPPED | OUTPATIENT
Start: 2025-06-13 | End: 2025-06-23

## 2025-06-13 NOTE — TELEPHONE ENCOUNTER
Patient called reference pustular discharge from her right hip surgical site.  Denies fever.  We have been treating her for UTI with Keflex but this was changed to clindamycin.

## 2025-06-23 ENCOUNTER — HOSPITAL ENCOUNTER (OUTPATIENT)
Age: 60
Discharge: HOME | End: 2025-06-23

## 2025-06-23 ENCOUNTER — HOSPITAL ENCOUNTER (OUTPATIENT)
Dept: GENERAL RADIOLOGY | Age: 60
Discharge: HOME | End: 2025-06-23

## 2025-06-23 ENCOUNTER — RESULTS FOLLOW-UP (OUTPATIENT)
Dept: FAMILY MEDICINE CLINIC | Age: 60
End: 2025-06-23

## 2025-06-23 ENCOUNTER — OFFICE VISIT (OUTPATIENT)
Dept: FAMILY MEDICINE CLINIC | Age: 60
End: 2025-06-23

## 2025-06-23 VITALS
HEART RATE: 102 BPM | DIASTOLIC BLOOD PRESSURE: 68 MMHG | WEIGHT: 223.6 LBS | OXYGEN SATURATION: 97 % | HEIGHT: 63 IN | TEMPERATURE: 99.2 F | SYSTOLIC BLOOD PRESSURE: 118 MMHG | BODY MASS INDEX: 39.62 KG/M2

## 2025-06-23 DIAGNOSIS — M25.551 ACUTE RIGHT HIP PAIN: ICD-10-CM

## 2025-06-23 DIAGNOSIS — M25.551 ACUTE RIGHT HIP PAIN: Primary | ICD-10-CM

## 2025-06-23 DIAGNOSIS — Z96.641 STATUS POST TOTAL HIP REPLACEMENT, RIGHT: ICD-10-CM

## 2025-06-23 LAB
BASOPHILS ABSOLUTE: 0.1 THOU/MM3 (ref 0–0.1)
BASOPHILS NFR BLD AUTO: 1.6 %
DEPRECATED RDW RBC AUTO: 47.4 FL (ref 35–45)
EOSINOPHIL NFR BLD AUTO: 1.5 %
EOSINOPHILS ABSOLUTE: 0.1 THOU/MM3 (ref 0–0.4)
ERYTHROCYTE [DISTWIDTH] IN BLOOD BY AUTOMATED COUNT: 14 % (ref 11.5–14.5)
HCT VFR BLD AUTO: 35.3 % (ref 37–47)
HGB BLD-MCNC: 11.4 GM/DL (ref 12–16)
IMM GRANULOCYTES # BLD AUTO: 0.04 THOU/MM3 (ref 0–0.07)
IMM GRANULOCYTES NFR BLD AUTO: 0.4 %
LYMPHOCYTES ABSOLUTE: 1.4 THOU/MM3 (ref 1–4.8)
LYMPHOCYTES NFR BLD AUTO: 15.3 %
MCH RBC QN AUTO: 29.8 PG (ref 26–33)
MCHC RBC AUTO-ENTMCNC: 32.3 GM/DL (ref 32.2–35.5)
MCV RBC AUTO: 92.4 FL (ref 81–99)
MONOCYTES ABSOLUTE: 0.6 THOU/MM3 (ref 0.4–1.3)
MONOCYTES NFR BLD AUTO: 6.8 %
NEUTROPHILS ABSOLUTE: 6.8 THOU/MM3 (ref 1.8–7.7)
NEUTROPHILS NFR BLD AUTO: 74.4 %
NRBC BLD AUTO-RTO: 0 /100 WBC
PLATELET # BLD AUTO: 834 THOU/MM3 (ref 130–400)
PMV BLD AUTO: 10.3 FL (ref 9.4–12.4)
RBC # BLD AUTO: 3.82 MILL/MM3 (ref 4.2–5.4)
WBC # BLD AUTO: 9.2 THOU/MM3 (ref 4.8–10.8)

## 2025-06-23 PROCEDURE — 99214 OFFICE O/P EST MOD 30 MIN: CPT | Performed by: NURSE PRACTITIONER

## 2025-06-23 PROCEDURE — 73502 X-RAY EXAM HIP UNI 2-3 VIEWS: CPT

## 2025-06-23 PROCEDURE — 36415 COLL VENOUS BLD VENIPUNCTURE: CPT

## 2025-06-23 PROCEDURE — 85025 COMPLETE CBC W/AUTO DIFF WBC: CPT

## 2025-06-23 RX ORDER — CLINDAMYCIN HYDROCHLORIDE 300 MG/1
300 CAPSULE ORAL 3 TIMES DAILY
Qty: 30 CAPSULE | Refills: 0 | Status: SHIPPED | OUTPATIENT
Start: 2025-06-23 | End: 2025-06-23

## 2025-06-23 RX ORDER — CEFDINIR 300 MG/1
300 CAPSULE ORAL 2 TIMES DAILY
Qty: 20 CAPSULE | Refills: 0 | Status: SHIPPED | OUTPATIENT
Start: 2025-06-23 | End: 2025-07-03

## 2025-06-23 RX ORDER — DICLOFENAC SODIUM 75 MG/1
75 TABLET, DELAYED RELEASE ORAL 2 TIMES DAILY
Qty: 60 TABLET | Refills: 3 | Status: SHIPPED | OUTPATIENT
Start: 2025-06-23

## 2025-06-23 RX ORDER — ONDANSETRON 4 MG/1
4 TABLET, FILM COATED ORAL EVERY 8 HOURS PRN
Qty: 30 TABLET | Refills: 0 | Status: SHIPPED | OUTPATIENT
Start: 2025-06-23 | End: 2025-07-03

## 2025-06-23 ASSESSMENT — ENCOUNTER SYMPTOMS
BACK PAIN: 0
NAUSEA: 1
RHINORRHEA: 0
TROUBLE SWALLOWING: 0
SHORTNESS OF BREATH: 0
COUGH: 0
ABDOMINAL PAIN: 0
CONSTIPATION: 0
EYE DISCHARGE: 0
WHEEZING: 0
SORE THROAT: 0
VOMITING: 0
ALLERGIC/IMMUNOLOGIC NEGATIVE: 1
DIARRHEA: 0
EYE REDNESS: 0
EYE PAIN: 0

## 2025-06-23 NOTE — PROGRESS NOTES
[facility]  [address] [id number]  Dept: [phone]  Dept Fax: [phone]  Loc: [phone]     Visit Date:  6/23/2025      Patient:  Robbin Rodriguez  YOB: 1965    HPI:     Chief Complaint   Patient presents with    Hip Pain     Has been getting worse over the last 10 days; feels on 6/15 pain has gotten worse. Wants incision re-checked too     Sweats     Night sweats        Patient here for postop follow-up on total right hip replacement a few weeks ago.  Patient has been following with physical therapy although she is not really making a lot of improvement  About 10 days ago I had placed her on clindamycin due to concern about possible infection  after pus coming from the wound and a fairly large amount.  Since then the patient has slowly become more fatigued, unwell feeling, feeling hot all the time, she had night sweats last night and is not sleeping much at all.  Pain is about the same.  Patient is following with orthopedics out of [place].  She did complete her 2-week follow-up and we are at about 5 weeks right now.        Medications    Current Outpatient Medications:     clindamycin (CLEOCIN) 300 MG capsule, Take 1 capsule by mouth 3 times daily for 10 days, Disp: 30 capsule, Rfl: 0    ondansetron (ZOFRAN) 4 MG tablet, Take 1 tablet by mouth every 8 hours as needed for Nausea, Disp: 30 tablet, Rfl: 0    diclofenac (VOLTAREN) 75 MG EC tablet, Take 1 tablet by mouth 2 times daily, Disp: 60 tablet, Rfl: 3    cefdinir (OMNICEF) 300 MG capsule, Take 1 capsule by mouth 2 times daily for 10 days, Disp: 20 capsule, Rfl: 0    DICLOFENAC PO, Take by mouth, Disp: , Rfl:     The patient is allergic to sulfa antibiotics.    Past Medical History  Robbin  has a past medical history of Headache, Hearing loss, Heart murmur, Migraine, and Neuropathy.    Subjective:      Review of Systems   Constitutional:  Positive for

## 2025-07-07 DIAGNOSIS — Z96.641 STATUS POST TOTAL HIP REPLACEMENT, RIGHT: Primary | ICD-10-CM

## 2025-07-07 RX ORDER — OXYCODONE HYDROCHLORIDE 5 MG/1
5 TABLET ORAL EVERY 8 HOURS PRN
Qty: 12 TABLET | Refills: 0 | Status: SHIPPED | OUTPATIENT
Start: 2025-07-07 | End: 2025-07-11

## 2025-07-15 DIAGNOSIS — Z96.641 STATUS POST TOTAL HIP REPLACEMENT, RIGHT: ICD-10-CM

## 2025-07-15 DIAGNOSIS — R60.0 EDEMA OF LEFT LOWER LEG: Primary | ICD-10-CM

## 2025-07-29 ENCOUNTER — HOSPITAL ENCOUNTER (OUTPATIENT)
Age: 60
Discharge: HOME | End: 2025-07-29

## 2025-07-29 DIAGNOSIS — Z96.641 STATUS POST TOTAL HIP REPLACEMENT, RIGHT: Primary | ICD-10-CM

## 2025-07-29 DIAGNOSIS — Z96.641 STATUS POST TOTAL HIP REPLACEMENT, RIGHT: ICD-10-CM

## 2025-07-29 LAB — CRP SERPL-MCNC: 3.04 MG/DL (ref 0–0.5)

## 2025-07-29 PROCEDURE — 85651 RBC SED RATE NONAUTOMATED: CPT

## 2025-07-29 PROCEDURE — 86140 C-REACTIVE PROTEIN: CPT

## 2025-07-29 PROCEDURE — 36415 COLL VENOUS BLD VENIPUNCTURE: CPT

## 2025-07-30 LAB — ERYTHROCYTE [SEDIMENTATION RATE] IN BLOOD BY WESTERGREN METHOD: 76 MM/HR (ref 0–20)

## 2025-08-21 DIAGNOSIS — T81.49XA POSTOPERATIVE WOUND INFECTION OF RIGHT HIP: ICD-10-CM

## 2025-08-21 DIAGNOSIS — Z96.641 STATUS POST TOTAL HIP REPLACEMENT, RIGHT: Primary | ICD-10-CM

## 2025-08-25 ENCOUNTER — OFFICE VISIT (OUTPATIENT)
Dept: FAMILY MEDICINE CLINIC | Age: 60
End: 2025-08-25

## 2025-08-25 ENCOUNTER — LAB (OUTPATIENT)
Dept: LAB | Age: 60
End: 2025-08-25

## 2025-08-25 ENCOUNTER — EXTERNAL LAB (OUTPATIENT)
Dept: HEALTH INFORMATION MANAGEMENT | Facility: OTHER | Age: 60
End: 2025-08-25

## 2025-08-25 ENCOUNTER — HOSPITAL ENCOUNTER (OUTPATIENT)
Age: 60
End: 2025-08-25
Attending: STUDENT IN AN ORGANIZED HEALTH CARE EDUCATION/TRAINING PROGRAM | Admitting: STUDENT IN AN ORGANIZED HEALTH CARE EDUCATION/TRAINING PROGRAM

## 2025-08-25 VITALS — DIASTOLIC BLOOD PRESSURE: 82 MMHG | HEART RATE: 90 BPM | SYSTOLIC BLOOD PRESSURE: 130 MMHG

## 2025-08-25 DIAGNOSIS — Z96.641 S/P HIP REPLACEMENT, RIGHT: Primary | ICD-10-CM

## 2025-08-25 DIAGNOSIS — Z01.818 PREOP EXAMINATION: ICD-10-CM

## 2025-08-25 LAB
BASOPHILS ABSOLUTE: 0.1 THOU/MM3 (ref 0–0.1)
BASOPHILS NFR BLD AUTO: 1.7 %
CRP SERPL-MCNC: 2.95 MG/DL (ref 0–0.5)
DEPRECATED RDW RBC AUTO: 49.9 FL (ref 35–45)
EOSINOPHIL NFR BLD AUTO: 3.4 %
EOSINOPHILS ABSOLUTE: 0.2 THOU/MM3 (ref 0–0.4)
ERYTHROCYTE [DISTWIDTH] IN BLOOD BY AUTOMATED COUNT: 15.6 % (ref 11.5–14.5)
ERYTHROCYTE [SEDIMENTATION RATE] IN BLOOD BY WESTERGREN METHOD: 47 MM/HR (ref 0–20)
HCT VFR BLD AUTO: 34.7 % (ref 37–47)
HGB BLD-MCNC: 10.7 GM/DL (ref 12–16)
IMM GRANULOCYTES # BLD AUTO: 0.01 THOU/MM3 (ref 0–0.07)
IMM GRANULOCYTES NFR BLD AUTO: 0.2 %
LAB RESULT: NORMAL
LYMPHOCYTES ABSOLUTE: 2 THOU/MM3 (ref 1–4.8)
LYMPHOCYTES NFR BLD AUTO: 30.8 %
MCH RBC QN AUTO: 27.2 PG (ref 26–33)
MCHC RBC AUTO-ENTMCNC: 30.8 GM/DL (ref 32.2–35.5)
MCV RBC AUTO: 88.1 FL (ref 81–99)
MONOCYTES ABSOLUTE: 0.6 THOU/MM3 (ref 0.4–1.3)
MONOCYTES NFR BLD AUTO: 9.8 %
NEUTROPHILS ABSOLUTE: 3.6 THOU/MM3 (ref 1.8–7.7)
NEUTROPHILS NFR BLD AUTO: 54.1 %
NRBC BLD AUTO-RTO: 0 /100 WBC
PLATELET # BLD AUTO: 550 THOU/MM3 (ref 130–400)
PMV BLD AUTO: 10.4 FL (ref 9.4–12.4)
RBC # BLD AUTO: 3.94 MILL/MM3 (ref 4.2–5.4)
WBC # BLD AUTO: 6.6 THOU/MM3 (ref 4.8–10.8)

## 2025-08-25 PROCEDURE — 99213 OFFICE O/P EST LOW 20 MIN: CPT | Performed by: NURSE PRACTITIONER

## 2025-08-25 ASSESSMENT — ENCOUNTER SYMPTOMS
RHINORRHEA: 0
COUGH: 0
BACK PAIN: 0
EYE REDNESS: 0
SORE THROAT: 0
EYE DISCHARGE: 0
TROUBLE SWALLOWING: 0
CONSTIPATION: 0
NAUSEA: 0
SHORTNESS OF BREATH: 0
ABDOMINAL PAIN: 0
VOMITING: 0
DIARRHEA: 0
WHEEZING: 0
ALLERGIC/IMMUNOLOGIC NEGATIVE: 1
EYE PAIN: 0

## 2025-08-26 RX ORDER — TRAMADOL HYDROCHLORIDE 50 MG/1
50 TABLET ORAL EVERY 8 HOURS PRN
Qty: 21 TABLET | Refills: 0 | Status: SHIPPED | OUTPATIENT
Start: 2025-08-26 | End: 2025-09-02

## 2025-08-26 SDOH — SOCIAL STABILITY: SOCIAL INSECURITY: HOW OFTEN DOES ANYONE, INCLUDING FAMILY AND FRIENDS, INSULT OR TALK DOWN TO YOU?: NEVER

## 2025-08-26 SDOH — SOCIAL STABILITY: SOCIAL INSECURITY: HOW OFTEN DOES ANYONE, INCLUDING FAMILY AND FRIENDS, THREATEN YOU WITH HARM?: NEVER

## 2025-08-26 SDOH — SOCIAL STABILITY: SOCIAL INSECURITY: HOW OFTEN DOES ANYONE, INCLUDING FAMILY AND FRIENDS, PHYSICALLY HURT YOU?: NEVER

## 2025-08-26 SDOH — SOCIAL STABILITY: SOCIAL INSECURITY: HOW OFTEN DOES ANYONE, INCLUDING FAMILY AND FRIENDS, SCREAM OR CURSE AT YOU?: NEVER

## 2025-08-26 ASSESSMENT — ACTIVITIES OF DAILY LIVING (ADL)
NEEDS_ASSIST: NO
ADL_SCORE: 12
ADL_BEFORE_ADMISSION: INDEPENDENT
HISTORY OF FALLING IN THE LAST YEAR (PRIOR TO ADMISSION): NO
ADL_SHORT_OF_BREATH: NO

## 2025-09-02 ENCOUNTER — ANESTHESIA (OUTPATIENT)
Dept: SURGERY | Age: 60
End: 2025-09-02

## 2025-09-02 ENCOUNTER — ANESTHESIA EVENT (OUTPATIENT)
Dept: SURGERY | Age: 60
End: 2025-09-02

## (undated) DEVICE — CLIP INT L POLYMER LOK LIG HEM O LOK

## (undated) DEVICE — SOLUTION ANTIFOG VIS SYS CLEARIFY LAPSCP

## (undated) DEVICE — SUTURE ETHBND D SPEC NO 0 UR 6 30IN D9436

## (undated) DEVICE — TUBING INSUFFLATOR HEAT HUMIDIFIED SMK EVAC SET PNEUMOCLEAR

## (undated) DEVICE — DRAIN CHN 19FR L0.25IN DIA6.3MM SIL RND HUBLESS FULL FLUT

## (undated) DEVICE — BASIC SINGLE BASIN BTC-LF: Brand: MEDLINE INDUSTRIES, INC.

## (undated) DEVICE — EVACUATOR SURG 100CC SIL BLB SUCT RESVR FOR CLS WND DRNGE

## (undated) DEVICE — CANNULA SEAL

## (undated) DEVICE — BAG SPEC REM 224ML W4XL6IN DIA10MM 1 HND GYN DISP ENDOPCH

## (undated) DEVICE — TROCAR: Brand: KII FIOS FIRST ENTRY

## (undated) DEVICE — GENERAL LAPAROSCOPY PACK-LF: Brand: MEDLINE INDUSTRIES, INC.

## (undated) DEVICE — TIP COVER ACCESSORY

## (undated) DEVICE — GOWN,SIRUS,NON REINFRCD,LARGE,SET IN SL: Brand: MEDLINE

## (undated) DEVICE — ADHESIVE SKIN CLSR 0.7ML TOP DERMBND ADV

## (undated) DEVICE — GLOVE SURG SZ 7 L12IN FNGR THK94MIL TRNSLUC YEL LTX HYDRGEL

## (undated) DEVICE — COLUMN DRAPE

## (undated) DEVICE — REDUCER: Brand: ENDOWRIST

## (undated) DEVICE — ELECTRO LUBE IS A SINGLE PATIENT USE DEVICE THAT IS INTENDED TO BE USED ON ELECTROSURGICAL ELECTRODES TO REDUCE STICKING.: Brand: KEY SURGICAL ELECTRO LUBE

## (undated) DEVICE — ARM DRAPE

## (undated) DEVICE — BLADELESS OBTURATOR: Brand: WECK VISTA

## (undated) DEVICE — APPLIER CLP L L13IN TI MULT RNG HNDL 20 CLP STR LIGACLP

## (undated) DEVICE — PUMP SUC IRR TBNG L10FT W/ HNDPC ASSEMB STRYKEFLOW 2

## (undated) DEVICE — SEAL

## (undated) DEVICE — AGENT HEMSTAT 3GM OXIDIZED REGENERATED CELOS ABSRB FOR CONT (ORDER MULTIPLES OF 5EA)